# Patient Record
Sex: FEMALE | Race: WHITE | NOT HISPANIC OR LATINO
[De-identification: names, ages, dates, MRNs, and addresses within clinical notes are randomized per-mention and may not be internally consistent; named-entity substitution may affect disease eponyms.]

---

## 2022-11-21 PROBLEM — Z00.00 ENCOUNTER FOR PREVENTIVE HEALTH EXAMINATION: Status: ACTIVE | Noted: 2022-11-21

## 2023-01-23 ENCOUNTER — NON-APPOINTMENT (OUTPATIENT)
Age: 48
End: 2023-01-23

## 2023-01-23 ENCOUNTER — APPOINTMENT (OUTPATIENT)
Dept: NEUROLOGY | Facility: CLINIC | Age: 48
End: 2023-01-23
Payer: COMMERCIAL

## 2023-01-23 VITALS
TEMPERATURE: 98.7 F | WEIGHT: 138 LBS | OXYGEN SATURATION: 98 % | BODY MASS INDEX: 23.27 KG/M2 | HEIGHT: 64.5 IN | HEART RATE: 82 BPM | SYSTOLIC BLOOD PRESSURE: 122 MMHG | DIASTOLIC BLOOD PRESSURE: 83 MMHG

## 2023-01-23 PROCEDURE — 99204 OFFICE O/P NEW MOD 45 MIN: CPT

## 2023-01-30 ENCOUNTER — OUTPATIENT (OUTPATIENT)
Dept: OUTPATIENT SERVICES | Facility: HOSPITAL | Age: 48
LOS: 1 days | End: 2023-01-30
Payer: SELF-PAY

## 2023-01-30 ENCOUNTER — RESULT REVIEW (OUTPATIENT)
Age: 48
End: 2023-01-30

## 2023-01-30 ENCOUNTER — APPOINTMENT (OUTPATIENT)
Dept: MRI IMAGING | Facility: HOSPITAL | Age: 48
End: 2023-01-30
Payer: SELF-PAY

## 2023-01-30 PROCEDURE — 70553 MRI BRAIN STEM W/O & W/DYE: CPT | Mod: 26

## 2023-01-30 PROCEDURE — 70553 MRI BRAIN STEM W/O & W/DYE: CPT

## 2023-01-30 PROCEDURE — A9585: CPT

## 2023-02-06 ENCOUNTER — APPOINTMENT (OUTPATIENT)
Dept: NEUROLOGY | Facility: CLINIC | Age: 48
End: 2023-02-06
Payer: COMMERCIAL

## 2023-02-06 ENCOUNTER — NON-APPOINTMENT (OUTPATIENT)
Age: 48
End: 2023-02-06

## 2023-02-06 PROCEDURE — 95816 EEG AWAKE AND DROWSY: CPT

## 2023-02-07 ENCOUNTER — APPOINTMENT (OUTPATIENT)
Dept: NEUROLOGY | Facility: CLINIC | Age: 48
End: 2023-02-07

## 2023-02-07 PROCEDURE — 95708 EEG WO VID EA 12-26HR UNMNTR: CPT

## 2023-02-07 PROCEDURE — 95719 EEG PHYS/QHP EA INCR W/O VID: CPT

## 2023-02-07 PROCEDURE — 95700 EEG CONT REC W/VID EEG TECH: CPT

## 2023-02-09 ENCOUNTER — APPOINTMENT (OUTPATIENT)
Dept: NEUROLOGY | Facility: CLINIC | Age: 48
End: 2023-02-09
Payer: COMMERCIAL

## 2023-02-09 VITALS
TEMPERATURE: 97.9 F | SYSTOLIC BLOOD PRESSURE: 108 MMHG | WEIGHT: 138 LBS | HEIGHT: 64.5 IN | RESPIRATION RATE: 16 BRPM | BODY MASS INDEX: 23.27 KG/M2 | HEART RATE: 77 BPM | OXYGEN SATURATION: 100 % | DIASTOLIC BLOOD PRESSURE: 70 MMHG

## 2023-02-09 PROCEDURE — 99214 OFFICE O/P EST MOD 30 MIN: CPT

## 2023-02-10 NOTE — ASSESSMENT
[FreeTextEntry1] : 48yo F w/ transient neurological event 06/2022 of unclear etiology. No known stroke risk factors and non-focal symptoms/findings. Also with persistent vertigo/tinnitus\par MRI brain 1/30/2023- 3 mm right cochlear nerve schwannoma\par \par We discussed that MRI brain findings likely related to tinnitus/vertigo. She has appointment with ENT scheduled . Findings however do not explain transient event that initially occurred. Will complete TIA work up\par Carotid Doppler\par Echocardiogram\par Obtained records from [facility] ER visit\par F/u in 2-3 months

## 2023-02-10 NOTE — ASSESSMENT
[FreeTextEntry1] : 46yo F w/ transient neurological event 06/2022 of unclear etiology. No known stroke risk factors and non-focal symptoms/findings. Also with persistent vertigo/tinnitus\par \par plan:\par MRI Brain w/wo con IAC protocol\par ENT referral\par Ambulatory EEG\par Obtained records from [facility] ER visit\par Will f/u w/ results

## 2023-02-10 NOTE — PHYSICAL EXAM
[FreeTextEntry1] : Mental status: Awake, alert and oriented x3. No dysarthria, no aphasia.  \par Cranial nerves: Pupils equally round and reactive to light, visual fields full, no nystagmus, extraocular muscles intact, V1 through V3 intact bilaterally and symmetric, face symmetric, hearing intact to finger rub, palate elevation symmetric, tongue was midline.\par Motor: MRC grading 5/5 UE/LE bilaterally. Normal tone and bulk. no abnormal movement \par Sensation: Intact to light touch,  \par Coordination: No dysmetria on finger-to-nose \par Reflexes: 2+ in bilateral UE/LE, \par Gait: narrow steady, stable toe, heel and tandem walk\par \par

## 2023-02-10 NOTE — DATA REVIEWED
[de-identified] : ACC: [id number]     EXAM:  MR IAC ONLY WAW IC   ORDERED BY: SHIRLEY AREVALO\par \par PROCEDURE DATE:  01/30/2023\par \par \par \par INTERPRETATION:  Sagittal, axial and coronal imaging of the brain was performed with attention to the internal auditory canals bilaterally.  T1 and T2 weighted sequences were acquired both before and following intravenous contrast administration, including volumetric and fat-suppressed sequences.\par \par Contrast dose: 7 cc of intravenous Gadavist.\par \par Clinical information: Tinnitus and vertigo.\par \par There is a 3 mm enhancing mass in the mid portion of the right internal auditory canal based on the cochlear nerve and most compatible with a cochlear nerve schwannoma. The left internal auditory canal is normal, and there is normal signal in the labyrinth bilaterally.\par \par The ventricles, sulci and cisterns are normal in caliber for the patient's age without additional intracranial mass or site of pathologic contrast enhancement. There is normal signal in the brain parenchyma on all pulse sequences. There is a partially empty sella. The craniocervical junction is normal. There is opacification of the left frontal sinus with the remaining paranasal sinuses as well as the mastoid air cells predominantly ventilated bilaterally.\par \par IMPRESSION:\par \par 3 mm right cochlear nerve schwannoma, as above.\par \par --- End of Report ---\par \par \par \par \par \par NEMESIO NICE MD; Attending Radiologist\par This document has been electronically signed. Feb 1 2023  1:19PM

## 2023-02-10 NOTE — HISTORY OF PRESENT ILLNESS
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 She said she turned pale and client recognized something was wrong and advised her to go to ER. She stopped the session and went to find her .  S  She expressed associated palpations, pressure behind OU w/ blurring, difficulty walking (legs felt like jello) and dizziness.  checked her vitals- /100, HR 80bpm. Decided to go to [facility] ER,  [facility]  negative for bleed/ischemia. She reports she felt like she was having trouble finding her words, knew she was confused for few hours. Later that day she experienced a bifrontal 7/10 pressure like HA that lasted a day. She denies focal limb weakness, sensation changes, changes in speech and hearing.  \par \par  About a week later she started  getting ringing in her right ear that would happened multiple time throughout the day as well as vertigo (whole day duration 1x / month when lying down felt like its throbbing in ear). Head exercise and OTC Dramamine helps w/ vertigo symptoms. however, both symptoms persist presently. 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

## 2023-02-10 NOTE — HISTORY OF PRESENT ILLNESS
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 She said she turned pale and client recognized something was wrong and advised her to go to ER. She stopped the session and went to find her .  S  She expressed associated palpations, pressure behind OU w/ blurring, difficulty walking (legs felt like jello) and dizziness.  checked her vitals- /100, HR 80bpm. Decided to go to [facility] ER,  [facility]  negative for bleed/ischemia. She reports she felt like she was having trouble finding her words, knew she was confused for few hours. Later that day she experienced a bifrontal 7/10 pressure like HA that lasted a day. She denies focal limb weakness, sensation changes, changes in speech and hearing.  \par \par  About a week later she started  getting ringing in her right ear that would happened multiple time throughout the day as well as vertigo (whole day duration 1x / month when lying down felt like its throbbing in ear). Head exercise and OTC Dramamine helps w/ vertigo symptoms. however, both symptoms persist presently. 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

## 2023-02-13 ENCOUNTER — APPOINTMENT (OUTPATIENT)
Dept: OTOLARYNGOLOGY | Facility: CLINIC | Age: 48
End: 2023-02-13
Payer: COMMERCIAL

## 2023-02-13 VITALS
HEIGHT: 64.5 IN | DIASTOLIC BLOOD PRESSURE: 84 MMHG | SYSTOLIC BLOOD PRESSURE: 127 MMHG | BODY MASS INDEX: 23.1 KG/M2 | WEIGHT: 137 LBS | HEART RATE: 88 BPM

## 2023-02-13 DIAGNOSIS — Z78.9 OTHER SPECIFIED HEALTH STATUS: ICD-10-CM

## 2023-02-13 DIAGNOSIS — Z83.3 FAMILY HISTORY OF DIABETES MELLITUS: ICD-10-CM

## 2023-02-13 PROCEDURE — 99205 OFFICE O/P NEW HI 60 MIN: CPT | Mod: 25

## 2023-02-13 PROCEDURE — 31231 NASAL ENDOSCOPY DX: CPT

## 2023-02-13 NOTE — HISTORY OF PRESENT ILLNESS
[de-identified] : 2/13/23\par 47-year-old female with history of septoplasty and sinus surgery presents with concern for dizziness as well as tinnitus.  Symptoms began in June/2022,  during a therapy session she had findings including,  confusion, loss to time and space as well as the feeling of electricity traveling from head to toes.  She was ultimately seen at the [facility] emergency room with a stroke ruled out.  The following week she began to have nonpulsatile tinnitus in the right ear as well as symptoms of dizziness - feeling pressure behind her eyes and feeling like "everything was moving with" her. Tinnitus is a high pitched, intermittent, nonpulsatile. She feels that this has been worsening and now notices this on the left. Sleeping well, minimal caffeine use, no new stress or alcohol use Denies hearing changes, otalgia or otorrhea. She reports lightheadedness and nausea for the past several weeks. She feels like her balance has been off. Occurs about 3x a month. No true episodes of "room spinning" for 6 months. No falls or LOC. No ear, nose, throat symptoms otherwise.\par \par She also reports facial pressure worse on the left. Symptoms intermittent. Pt with known hx of: vertigo. PTSD, anxiety, depression\par \par Patient seen by neurology,  and diagnosed with a transient neurologic event.  MRI/IAC completed with concern for vestibular schwannoma, 3 mm, on the right side.  TIA work-up has been negative to date.    EEG has been unremarkable.    Further testing with carotid Doppler and echocardiogram to be completed.

## 2023-02-13 NOTE — ASSESSMENT
[FreeTextEntry1] : 47-year-old female who presents with concern for tinnitus and vertiginous symptoms.  Examination today has been normal.  At this time I am recommending audiogram, tympanogram, VNG.  Patient with recent diagnosis of vestibular schwannoma on MRI.  Recommending consultation with neurosurgery for further monitoring and management.  Patient will follow-up after the above, sooner should symptoms worsen or fail to improve.\par \par Of note, patient has history of sinus surgery and reports intermittent facial pressure. MRI IAC did show opacification of the left front sinus. We will treat with 10 days of Augmentin. Follow up after testing to review results and repeat eval. \par \par –consultation with neurosurgery, Dr. Abraham, for monitoring of vestibular schwannoma, info given \par – Audiogram and tympanogram\par – VNG\par - Augmentin x 10 days \par - continue nasal saline irrigation \par – Follow-up after the above, sooner should symptoms worsen or fail to improve

## 2023-02-13 NOTE — PROCEDURE
[FreeTextEntry6] : -\par Nasal Endoscopy Procedure Note\par \par Pre-operative Diagnosis: facial pressure \par Post-operative Diagnosis: evidence of sinus surgery\par Anesthesia: Topical\par Procedure: Bilateral nasal endoscopy\par  \par Procedure Details: \par After topical anesthesia and decongestant, the patient was placed in the supine position. The telescope was passed along the left nasal floor to the nasopharynx. It was then passed into the region of the middle meatus, middle turbinate, and the sphenoethmoid region. An identical procedure was performed on the right side. \par  \par Findings: \par Mucosa: 	 normal	\par Nasal septum: normal	\par Discharge: 	none	\par Turbinates: 	normal	\par Adenoid: 	 normal	\par Posterior choanae: 	normal	\par Eustachian tubes: 	normal	\par Mucous stranding: 	normal 	\par Lesions: 	 Not present	\par  \par Comments: \par Condition: Stable. Patient tolerated procedure well.\par

## 2023-02-13 NOTE — PHYSICAL EXAM
[Midline] : trachea located in midline position [Normal] : no rashes [] : [place]-Hall[place] test is negative

## 2023-02-13 NOTE — REASON FOR VISIT
[Initial Consultation] : an initial consultation for [FreeTextEntry2] : tinnitus, vertigo, vestibular schwanoma

## 2023-02-16 ENCOUNTER — APPOINTMENT (OUTPATIENT)
Dept: GASTROENTEROLOGY | Facility: CLINIC | Age: 48
End: 2023-02-16
Payer: COMMERCIAL

## 2023-02-16 VITALS
HEIGHT: 64.5 IN | TEMPERATURE: 97.3 F | WEIGHT: 135.8 LBS | HEART RATE: 98 BPM | OXYGEN SATURATION: 100 % | SYSTOLIC BLOOD PRESSURE: 133 MMHG | DIASTOLIC BLOOD PRESSURE: 84 MMHG | BODY MASS INDEX: 22.9 KG/M2

## 2023-02-16 DIAGNOSIS — Z12.11 ENCOUNTER FOR SCREENING FOR MALIGNANT NEOPLASM OF COLON: ICD-10-CM

## 2023-02-16 PROCEDURE — 99203 OFFICE O/P NEW LOW 30 MIN: CPT

## 2023-02-16 NOTE — PHYSICAL EXAM
[Alert] : alert [No Acute Distress] : no acute distress [Sclera] : the sclera and conjunctiva were normal [No Respiratory Distress] : no respiratory distress [No Acc Muscle Use] : no accessory muscle use [Respiration, Rhythm And Depth] : normal respiratory rhythm and effort [Heart Rate And Rhythm] : heart rate was normal and rhythm regular [Abdomen Tenderness] : non-tender [Abdomen Soft] : soft [] : no rash [No Focal Deficits] : no focal deficits [Oriented To Time, Place, And Person] : oriented to person, place, and time

## 2023-02-19 ENCOUNTER — APPOINTMENT (OUTPATIENT)
Dept: ULTRASOUND IMAGING | Facility: HOSPITAL | Age: 48
End: 2023-02-19

## 2023-02-19 ENCOUNTER — OUTPATIENT (OUTPATIENT)
Dept: OUTPATIENT SERVICES | Facility: HOSPITAL | Age: 48
LOS: 1 days | End: 2023-02-19
Payer: COMMERCIAL

## 2023-02-19 PROCEDURE — 93880 EXTRACRANIAL BILAT STUDY: CPT | Mod: 26

## 2023-03-01 PROBLEM — Z82.0 FAMILY HISTORY OF ALZHEIMER'S DISEASE: Status: ACTIVE | Noted: 2023-02-09

## 2023-03-01 PROBLEM — Z82.49 FAMILY HISTORY OF CARDIAC DISORDER: Status: ACTIVE | Noted: 2023-02-09

## 2023-03-02 NOTE — ASSESSMENT
[FreeTextEntry1] : 47F with a h/o vestibular schwannoma who presents for symptoms of abdominal pain and bloating. \par \par #Epigastric pain\par - PPI trial x2 weeks to assess improvement, rx sent\par - will plan on EGD at [facility]\par \par #Screening colonoscopy\par - no prior colonoscopy\par - will arrange for colonoscopy with miralax prep at [facility]\par - risks/benefits/alternatives discussed\par - pre-procedural COVID swab and post-procedural escort discussed\par \par Rosemary Bertrand MD\par PGY-6, Gastroenterology Fellow

## 2023-03-02 NOTE — HISTORY OF PRESENT ILLNESS
[FreeTextEntry1] : 47F with a h/o vestibular schwannoma who presents for symptoms of abdominal pain and bloating. The pain is mostly epigastric, started a month ago, and she cannot pinpoint any alleviating or aggravating factors. With spicy food, she feels like she has more pronounced reactions. She takes medications for her neurological issues, but has been on them for a year. Has some nausea, though attributes to her schwannoma. Intermittently has constipation, but it is improved with fiber intake. Has never had EGD or colonoscopy. \par \par Family h/o negative for colon, stomach, pancreas cancer, Crohn's disease, UC. 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 Former smoker, mostly social but quit in 2000.

## 2023-03-06 ENCOUNTER — APPOINTMENT (OUTPATIENT)
Dept: NEUROSURGERY | Facility: CLINIC | Age: 48
End: 2023-03-06
Payer: COMMERCIAL

## 2023-03-06 ENCOUNTER — NON-APPOINTMENT (OUTPATIENT)
Age: 48
End: 2023-03-06

## 2023-03-06 VITALS
WEIGHT: 135 LBS | TEMPERATURE: 97 F | OXYGEN SATURATION: 98 % | SYSTOLIC BLOOD PRESSURE: 126 MMHG | BODY MASS INDEX: 23.05 KG/M2 | DIASTOLIC BLOOD PRESSURE: 85 MMHG | HEART RATE: 102 BPM | RESPIRATION RATE: 18 BRPM | HEIGHT: 64 IN

## 2023-03-06 DIAGNOSIS — Z82.0 FAMILY HISTORY OF EPILEPSY AND OTHER DISEASES OF THE NERVOUS SYSTEM: ICD-10-CM

## 2023-03-06 DIAGNOSIS — Z82.49 FAMILY HISTORY OF ISCHEMIC HEART DISEASE AND OTHER DISEASES OF THE CIRCULATORY SYSTEM: ICD-10-CM

## 2023-03-06 PROCEDURE — 99204 OFFICE O/P NEW MOD 45 MIN: CPT

## 2023-03-13 ENCOUNTER — RESULT REVIEW (OUTPATIENT)
Age: 48
End: 2023-03-13

## 2023-03-13 ENCOUNTER — APPOINTMENT (OUTPATIENT)
Dept: OTOLARYNGOLOGY | Facility: CLINIC | Age: 48
End: 2023-03-13
Payer: COMMERCIAL

## 2023-03-13 VITALS
HEART RATE: 98 BPM | SYSTOLIC BLOOD PRESSURE: 121 MMHG | HEIGHT: 64 IN | TEMPERATURE: 95 F | WEIGHT: 134 LBS | DIASTOLIC BLOOD PRESSURE: 82 MMHG | OXYGEN SATURATION: 98 % | BODY MASS INDEX: 22.88 KG/M2

## 2023-03-13 DIAGNOSIS — J01.90 ACUTE SINUSITIS, UNSPECIFIED: ICD-10-CM

## 2023-03-13 PROCEDURE — 92540 BASIC VESTIBULAR EVALUATION: CPT

## 2023-03-13 PROCEDURE — 92537 CALORIC VSTBLR TEST W/REC: CPT

## 2023-03-13 PROCEDURE — 92550 TYMPANOMETRY & REFLEX THRESH: CPT | Mod: 52

## 2023-03-13 PROCEDURE — 92557 COMPREHENSIVE HEARING TEST: CPT

## 2023-03-13 PROCEDURE — 99215 OFFICE O/P EST HI 40 MIN: CPT | Mod: 25

## 2023-03-13 PROCEDURE — 31575 DIAGNOSTIC LARYNGOSCOPY: CPT

## 2023-03-13 NOTE — REASON FOR VISIT
[New Patient Visit] : a new patient visit [Referred By: _________] : Patient was referred by JESICA [FreeTextEntry1] : R vestibular schwannoma

## 2023-03-13 NOTE — ASSESSMENT
[FreeTextEntry1] : PLAN\par - repeat MRI brain IAC w/wo in 6 months for R vestibular schwannoma evaluation\par - f/u after image

## 2023-03-13 NOTE — HISTORY OF PRESENT ILLNESS
[> 3 months] : more  than 3 months [FreeTextEntry1] : vertigo, tinnitus [de-identified] : 46 yo F with PMH of depression, anxiety, PTSD who reports chronic dizziness, tinnitus on R ear. 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 He went [facility] ED at that time where CTH showed no bleeding/ischemic stroke. And she started to noticed intermittent ringing in the R ear with vertigo. She tried OTC Dramamine without relief. She was seen by neurology (Dr. Ortez) initially where MRI internal auditory cannel revealed 3mm R cochlear nerve schwannoma and referred to ENT. In trim, she had EEG, carotid artery US, nasal endoscopy test were completed which showed unremarkable result. \par \par She reports progressively worsening ringing in ear initially on R sided couple which initially started 2 weeks after ED visit in 6/2022 but noticed past 2 weeks of L sided as well. She states dizziness, palpitation, weakness, words finding has been improved.\par \par pending audiogram/VNG scheduled in next week.

## 2023-03-13 NOTE — PROCEDURE
[de-identified] : -\par Pre-operative Diagnosis:   Concern for neck mass/thyroglossal duct cyst\par Post-operative Diagnosis:  normal exam\par Anesthesia: Topical - 1 % Lidocaine/Phenylephrine\par Procedure:  Flexible Laryngoscopy\par  \par Procedure Details:  \par The patient was placed in the sitting position.  After decongestant and anesthesia were applied the laryngoscope was passed.  The nasal cavities, nasopharynx, oropharynx, hypopharynx, and larynx were all examined.  Vocal folds were examined during respiration and phonation.  The following findings were noted:\par \par Findings:  \par Nose: Septum is midline, turbinates are normal, nasal airways patent, mucosa normal with postsurgical change\par Nasopharynx: Adenoids normal, no masses, eustachian tube normal\par Oropharynx: Pharyngeal walls symmetric and without lesion. Tonsils/fossae symmetric\par Hypopharynx: Hypopharynx and pyriform sinuses without lesion. No masses or asymmetry.  No pooling of secretions.\par Larynx:  Epiglottis and aryepiglottic folds were sharp and crisp bilaterally.  Bilateral false and true vocal folds normal appearance. Bilateral vocal folds fully mobile and symmetric.  Airway was widely patent.\par \par Condition: Stable.  Patient tolerated procedure well.\par \par Complications: None\par \par

## 2023-03-13 NOTE — DATA REVIEWED
[de-identified] : ACC: [id number] EXAM: MR IAC ONLY WAW IC ORDERED BY: SHIRLEY AREVALO\par \par PROCEDURE DATE: 01/30/2023\par \par \par \par INTERPRETATION: Sagittal, axial and coronal imaging of the brain was performed with attention to the internal auditory canals bilaterally. T1 and T2 weighted sequences were acquired both before and following intravenous contrast administration, including volumetric and fat-suppressed sequences.\par \par Contrast dose: 7 cc of intravenous Gadavist.\par \par Clinical information: Tinnitus and vertigo.\par \par There is a 3 mm enhancing mass in the mid portion of the right internal auditory canal based on the cochlear nerve and most compatible with a cochlear nerve schwannoma. The left internal auditory canal is normal, and there is normal signal in the labyrinth bilaterally.\par \par The ventricles, sulci and cisterns are normal in caliber for the patient's age without additional intracranial mass or site of pathologic contrast enhancement. There is normal signal in the brain parenchyma on all pulse sequences. There is a partially empty sella. The craniocervical junction is normal. There is opacification of the left frontal sinus with the remaining paranasal sinuses as well as the mastoid air cells predominantly ventilated bilaterally.\par \par IMPRESSION:\par \par 3 mm right cochlear nerve schwannoma, as above.\par \par --- End of Report ---\par \par \par \par \par \par NEMESIO NICE MD; Attending Radiologist\par This document has been electronically signed. Feb 1 2023 1:19PM

## 2023-03-13 NOTE — ASSESSMENT
[FreeTextEntry1] : 47-year-old female who presents with concern for tinnitus and vertiginous symptoms.  Examination today has been normal.  At this time I am recommending audiogram, tympanogram, VNG.  Patient with recent diagnosis of vestibular schwannoma on MRI.   neurosurgery recommending follow-up in 6 months for repeat MRI.  Audiogram essentially unremarkable with some evidence of high-frequency conductive loss with type A tympanogram bilaterally.    VNG did have some findings consistent with a central ocular motor finding and I am recommending continued follow-up with neurology.\par \par   In terms of concern for sinusitis patient took Augmentin and notes complete resolution of symptoms.   she will continue with nasal saline irrigation as needed and let me know if symptoms return at which point I would recommend formal CT sinus imaging.\par \par   In terms of concern for neck mass exam essentially normal.  Will recommend ultrasound neck to evaluate for potential thyroglossal duct cyst or other lesion.\par \par –Continue follow-up with neurosurgery\par – Ultrasound neck\par – Nasal saline irrigation\par – Continue consultation with neurology\par – Follow-up after ultrasound is completed to review results.

## 2023-03-13 NOTE — DATA REVIEWED
[de-identified] :   3/13/2023\par  tympanogram A bilaterally\par  audiogram shows normal sloping to mild conductive hearing loss bilaterally\par \par  VNG essentially normal:  the eye-movement recordings in the pursuit test showed low gain and asymmetry.  This is a central ocular motor finding

## 2023-03-13 NOTE — HISTORY OF PRESENT ILLNESS
[de-identified] : 2/13/23\par 47-year-old female who presents with concern for dizziness as well as tinnitus.  Symptoms began in June/2022,  during a therapy session she had findings including,  confusion, loss to time and space as well as the feeling of electricity traveling from head to toes.  She was ultimately seen at the [facility] emergency room with a stroke ruled out.  The following week she began to have nonpulsatile tinnitus in the right ear as well as symptoms of dizziness.\par \par   She describes the dizziness as,                        no ear, nose, throat symptoms otherwise.\par \par Pt with known hx of: vertigo. PTSD, anxiety, depression\par \par   Patient seen by neurology,  and diagnosed with a transient neurologic event.  MRI/IAC completed with concern for vestibular schwannoma, 3 mm, on the right side.  TIA work-up has been negative to date.    EEG has been unremarkable.    Further testing with carotid Doppler and echocardiogram to be completed.\par - [FreeTextEntry1] :  3/13/2023\par  the patient did complete Augmentin and notes complete resolution of headache type symptoms over the left frontal sinus.    In terms of dizziness and tinnitus patient overall stable since last visit.  She feels that some of her symptoms of dizziness have improved.  She feels that the  tinnitus is more prominent bilaterally.    Has been following with neurosurgery with plan for repeat MRI in 6 months.  Today did complete audiogram as well as VNG and presents to review those results.\par \par  patient is also concerned about some more prominence over the thyroid cartilage.  Is not causing any issues chewing, eating, swallowing or voice changes.  No breathing issues.  She is concerned that she might have a thyroid issue.  No ear, nose, throat symptoms otherwise.

## 2023-03-16 ENCOUNTER — APPOINTMENT (OUTPATIENT)
Age: 48
End: 2023-03-16
Payer: COMMERCIAL

## 2023-03-16 ENCOUNTER — RESULT REVIEW (OUTPATIENT)
Age: 48
End: 2023-03-16

## 2023-03-16 PROCEDURE — 43239 EGD BIOPSY SINGLE/MULTIPLE: CPT

## 2023-03-16 PROCEDURE — 45378 DIAGNOSTIC COLONOSCOPY: CPT

## 2023-03-20 ENCOUNTER — OUTPATIENT (OUTPATIENT)
Dept: OUTPATIENT SERVICES | Facility: HOSPITAL | Age: 48
LOS: 1 days | End: 2023-03-20
Payer: COMMERCIAL

## 2023-03-20 ENCOUNTER — APPOINTMENT (OUTPATIENT)
Dept: ULTRASOUND IMAGING | Facility: HOSPITAL | Age: 48
End: 2023-03-20
Payer: COMMERCIAL

## 2023-03-20 PROCEDURE — 76536 US EXAM OF HEAD AND NECK: CPT

## 2023-03-20 PROCEDURE — 76536 US EXAM OF HEAD AND NECK: CPT | Mod: 26

## 2023-04-10 ENCOUNTER — APPOINTMENT (OUTPATIENT)
Dept: OTOLARYNGOLOGY | Facility: CLINIC | Age: 48
End: 2023-04-10
Payer: COMMERCIAL

## 2023-04-10 ENCOUNTER — TRANSCRIPTION ENCOUNTER (OUTPATIENT)
Age: 48
End: 2023-04-10

## 2023-04-10 VITALS
WEIGHT: 134 LBS | BODY MASS INDEX: 22.88 KG/M2 | DIASTOLIC BLOOD PRESSURE: 83 MMHG | HEART RATE: 91 BPM | TEMPERATURE: 97.2 F | SYSTOLIC BLOOD PRESSURE: 125 MMHG | HEIGHT: 64 IN

## 2023-04-10 DIAGNOSIS — H93.13 TINNITUS, BILATERAL: ICD-10-CM

## 2023-04-10 DIAGNOSIS — R22.1 LOCALIZED SWELLING, MASS AND LUMP, NECK: ICD-10-CM

## 2023-04-10 PROCEDURE — 31231 NASAL ENDOSCOPY DX: CPT

## 2023-04-10 PROCEDURE — 99214 OFFICE O/P EST MOD 30 MIN: CPT | Mod: 25

## 2023-04-11 NOTE — PROCEDURE
[FreeTextEntry6] : -\par Procedure Note\par   \par Pre-operative Diagnosis: facial pressure \par Post-operative Diagnosis: normal exam w/ evidence of sinus surgery \par Anesthesia: Topical\par Procedure: Bilateral nasal endoscopy\par   \par Procedure Details: \par After topical anesthesia and decongestant, the patient was placed in the supine position. The telescope was passed along the left nasal floor to the nasopharynx. It was then passed into the region of the middle meatus, middle turbinate, and the sphenoethmoid region.  An identical procedure was performed on the right side. \par   \par Findings: \par Mucosa: 	                normal	\par Nasal septum: 	midline 	\par Discharge: 	none	\par Turbinates: 	normal	\par Adenoid: 	                normal	\par Posterior choanae: 	normal	\par Eustachian tubes: 	normal	\par Mucous stranding: 	normal 	\par Lesions: 	                Not present	\par   \par Comments: \par Condition: Stable. Patient tolerated procedure well.\par Complications: None\par \par

## 2023-04-11 NOTE — ASSESSMENT
[FreeTextEntry1] : 47-year-old female who presents with concern for tinnitus and vertiginous symptoms. Examination today has been normal. At this time I am recommending audiogram, tympanogram, VNG. Patient with recent diagnosis of vestibular schwannoma on MRI. neurosurgery recommending follow-up in 6 months for repeat MRI. Audiogram essentially unremarkable with some evidence of high-frequency conductive loss with type A tympanogram bilaterally. VNG did have some findings consistent with a central ocular motor finding and I am recommending continued follow-up with neurology.\par \par  In terms of concern for sinusitis patient took Augmentin and notes complete resolution of symptoms. she will continue with nasal saline irrigation as needed and let me know if symptoms return at which point I would recommend formal CT sinus imaging.\par \par  In terms of concern for neck mass exam essentially normal. Will recommend ultrasound neck to evaluate for potential thyroglossal duct cyst or other lesion.\par \par Patient presents today to review thyroid US which showed "normal size with few benign tiny spongiform nodules." Recommend annual thyroid US. Patient also with concern for recurrence of facial pressure. Exam normal today. I am recommending continued nasal saline irrigation. Otherwise plan unchanged, including fu with neurosurgery for vestibular schwannoma and neurology consultation for central ocular motor findings on VNG. \par \par –Continue follow-up with neurosurgery\par – Nasal saline irrigation\par – Continue consultation with neurology\par - annual thyroid US \par - fu with us PRN \par \par

## 2023-04-11 NOTE — HISTORY OF PRESENT ILLNESS
[de-identified] : 2/13/23\par 47-year-old female who presents with concern for dizziness as well as tinnitus. Symptoms began in June/2022, during a therapy session she had findings including, confusion, loss to time and space as well as the feeling of electricity traveling from head to toes. She was ultimately seen at the [facility] emergency room with a stroke ruled out. The following week she began to have nonpulsatile tinnitus in the right ear as well as symptoms of dizziness.\par \par  She describes the dizziness as,  no ear, nose, throat symptoms otherwise.\par \par Pt with known hx of: vertigo. PTSD, anxiety, depression\par \par  Patient seen by neurology, and diagnosed with a transient neurologic event. MRI/IAC completed with concern for vestibular schwannoma, 3 mm, on the right side. TIA work-up has been negative to date. EEG has been unremarkable. Further testing with carotid Doppler and echocardiogram to be completed.\par - \par Interval History: 3/13/2023\par  the patient did complete Augmentin and notes complete resolution of headache type symptoms over the left frontal sinus. In terms of dizziness and tinnitus patient overall stable since last visit. She feels that some of her symptoms of dizziness have improved. She feels that the tinnitus is more prominent bilaterally. Has been following with neurosurgery with plan for repeat MRI in 6 months. Today did complete audiogram as well as VNG and presents to review those results.\par \par  patient is also concerned about some more prominence over the thyroid cartilage. Is not causing any issues chewing, eating, swallowing or voice changes. No breathing issues. She is concerned that she might have a thyroid issue. No ear, nose, throat symptoms otherwise. \par - [FreeTextEntry1] : 4/10/23\par Pt presents today to review thyroid US as well as concern for pressure around her eyes for 3 days. Denies drainage, nasal congestion, changes in sense of taste or smell. She has been using nasal saline irrigation. Denies difficulty chewing, eating or swallowing. No breathing issues. No voice changes. She has an appt in June with neurology. Dizziness has improved. No other ENT issues.

## 2023-04-11 NOTE — PHYSICAL EXAM
[Nasal Endoscopy Performed] : nasal endoscopy was performed, see procedure section for findings [Normal] : temporomandibular joint is normal [de-identified] : neck fullness

## 2023-05-01 ENCOUNTER — APPOINTMENT (OUTPATIENT)
Dept: ENDOCRINOLOGY | Facility: CLINIC | Age: 48
End: 2023-05-01
Payer: COMMERCIAL

## 2023-05-01 VITALS
WEIGHT: 135 LBS | HEART RATE: 89 BPM | SYSTOLIC BLOOD PRESSURE: 123 MMHG | BODY MASS INDEX: 23.17 KG/M2 | DIASTOLIC BLOOD PRESSURE: 82 MMHG

## 2023-05-01 DIAGNOSIS — R79.89 OTHER SPECIFIED ABNORMAL FINDINGS OF BLOOD CHEMISTRY: ICD-10-CM

## 2023-05-01 PROCEDURE — 99205 OFFICE O/P NEW HI 60 MIN: CPT

## 2023-05-03 NOTE — DATA REVIEWED
[FreeTextEntry1] : Imaging:\par - 03/20/23 Thryoid US:\par Findings:\par RIGHT LOBE:\par Dimensions: 4.8 x 1.1 x 1.3 cm (sagittal x AP x transverse)\par Echotexture: Homogenous\par Vascularity: Normal\par The right lobe contains the following nodules\par Nodule 1:\par Location: Interpolar\par Dimensions: 0.3 x 0.2 x 0.2 cm (sagittal x AP x transverse)\par Composition: Spongiform,  no suspicious features\par Nodule 2:\par Location: Interpolar\par Dimensions: 0.2 x 0.2 x 0.2 cm (sagittal x AP x transverse)\par Composition: Spongiform, no suspicious features \par LEFT LOBE:\par Dimensions: 3.4 x 1.1 x 1.2 cm (sagittal x AP x transverse)\par Echotexture: Homogenous\par Vascularity: Normal\par The left lobe contains the following nodules\par Nodule 1:\par Location: Interpolar\par Dimensions: 0.2 x 0.1 x 0.1 cm (sagittal x AP x transverse)\par Composition: Spongiform, no suspicious features\par ISTHMUS:\par Dimensions: 0.1 cm AP\par The isthmus lobe contains the following nodules\par CERVICAL LYMPH NODE EVALUATION: -  No abnormal lymph nodes are identified in the neck.\par PARATHYROID EXAMINATION:-  Parathyroid glands not visualized.\par IMPRESSION:\par No evidence of thyroglossal duct cyst. Normal size thyroid gland with few benign tiny spongiform nodules.\par

## 2023-05-03 NOTE — PHYSICAL EXAM
[Alert] : alert [Well Nourished] : well nourished [No Acute Distress] : no acute distress [Well Developed] : well developed [Normal Sclera/Conjunctiva] : normal sclera/conjunctiva [No Proptosis] : no proptosis [EOMI] : extra ocular movement intact [Normal Oropharynx] : the oropharynx was normal [No Thyroid Nodules] : no palpable thyroid nodules [No Accessory Muscle Use] : no accessory muscle use [Clear to Auscultation] : lungs were clear to auscultation bilaterally [Normal S1, S2] : normal S1 and S2 [Normal Rate] : heart rate was normal [Regular Rhythm] : with a regular rhythm [Pedal Pulses Normal] : the pedal pulses are present [Normal Bowel Sounds] : normal bowel sounds [Not Tender] : non-tender [Not Distended] : not distended [Soft] : abdomen soft [No Spinal Tenderness] : no spinal tenderness [Spine Straight] : spine straight [Normal Gait] : normal gait [Normal Strength/Tone] : muscle strength and tone were normal [No Rash] : no rash [Normal Reflexes] : deep tendon reflexes were 2+ and symmetric [No Tremors] : no tremors [Oriented x3] : oriented to person, place, and time [Thyroid Not Enlarged] : the thyroid was not enlarged [Acanthosis Nigricans] : no acanthosis nigricans [de-identified] : R lobe visualized upon swallowing, R Lobe palpated, no nodules, no mass L3 lymph node palpated

## 2023-05-03 NOTE — ADDENDUM
[FreeTextEntry1] : I, Juju Rousseau act soley as a scribe for Dr. Carter Arechiga on this date. 05/01/2023 \par \par Referring OBGYN: Dr. Sarah Quinonez Phone [phone] \par

## 2023-05-03 NOTE — HISTORY OF PRESENT ILLNESS
[FreeTextEntry1] : 47 year old F pt, with Hx of abnormal T3 levels  (~02/2023), referred by Dr. Sarah Quinonez (OBGYN), presents today to establish endocrine care. \par Other PMHx: tinnitus, vestibular schwannoma, anxiety \par PSHx: tonsillectomy (2008), deviated septum (2006)\par FHx: Father: Heart disease.  No FHx of: thyroid disorder\par Allergies: codeine, prazosin, benzonatate, promethazine \par P0\par PCP: Dr. 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 She endorses hair loss\par \par [Medications verified as per pt on 05/01/2023) \par Current Medications: Wellbutrin 450 mg, Lamictal 25 mg, Spironolactone 25 mg, Minoxidil 2.5 mg, BuSpar PRN, Finasteride 1 mg, Propranolol 30 mg, Norethindrone-Ethinyl Estradiol 20 mcg  contraceptives

## 2023-05-03 NOTE — REASON FOR VISIT
[Initial Evaluation] : an initial evaluation [Other___] : [unfilled] [FreeTextEntry2] : Dr. Sarah Quinonez [phone]

## 2023-05-03 NOTE — END OF VISIT
[FreeTextEntry3] : All medical record entries made by the Scribe were at my, Dr. Carter Arechiga, direction and personally dictated by me on 05/01/2023. I have reviewed the chart and agree that the record accurately reflects my personal performance of the history, physical exam, assessment and plan. I have also personally directed, reviewed and agreed with the chart.  [Time Spent: ___ minutes] : I have spent [unfilled] minutes of time on the encounter.

## 2023-05-08 PROCEDURE — 97164 PT RE-EVAL EST PLAN CARE: CPT

## 2023-05-08 PROCEDURE — 93880 EXTRACRANIAL BILAT STUDY: CPT

## 2023-06-03 LAB
DHEA-S SERPL-MCNC: 42.8 UG/DL
T3FREE SERPL-MCNC: 2.8 PG/ML
T4 FREE SERPL-MCNC: 1.2 NG/DL
TSH SERPL-ACNC: 3.66 UIU/ML

## 2023-06-06 ENCOUNTER — APPOINTMENT (OUTPATIENT)
Age: 48
End: 2023-06-06
Payer: COMMERCIAL

## 2023-06-06 VITALS
BODY MASS INDEX: 23.56 KG/M2 | OXYGEN SATURATION: 97 % | TEMPERATURE: 97.3 F | RESPIRATION RATE: 15 BRPM | SYSTOLIC BLOOD PRESSURE: 125 MMHG | HEIGHT: 64 IN | WEIGHT: 138 LBS | DIASTOLIC BLOOD PRESSURE: 80 MMHG | HEART RATE: 93 BPM

## 2023-06-06 PROCEDURE — 99214 OFFICE O/P EST MOD 30 MIN: CPT

## 2023-06-06 RX ORDER — OMEPRAZOLE 40 MG/1
40 CAPSULE, DELAYED RELEASE ORAL
Qty: 30 | Refills: 3 | Status: DISCONTINUED | COMMUNITY
Start: 2023-02-16 | End: 2023-06-06

## 2023-06-06 RX ORDER — SPIRONOLACTONE 50 MG/1
50 TABLET ORAL
Qty: 30 | Refills: 0 | Status: DISCONTINUED | COMMUNITY
Start: 2023-02-06 | End: 2023-06-06

## 2023-06-06 RX ORDER — AMOXICILLIN AND CLAVULANATE POTASSIUM 875; 125 MG/1; MG/1
875-125 TABLET, COATED ORAL
Qty: 20 | Refills: 0 | Status: DISCONTINUED | COMMUNITY
Start: 2023-02-13 | End: 2023-06-06

## 2023-06-07 NOTE — PHYSICAL EXAM
[Alert] : alert [No Acute Distress] : no acute distress [Sclera] : the sclera and conjunctiva were normal [No Respiratory Distress] : no respiratory distress [No Acc Muscle Use] : no accessory muscle use [Respiration, Rhythm And Depth] : normal respiratory rhythm and effort [Heart Rate And Rhythm] : heart rate was normal and rhythm regular [Abdomen Tenderness] : non-tender [Abdomen Soft] : soft [] : no rash [No Focal Deficits] : no focal deficits [Oriented To Time, Place, And Person] : oriented to person, place, and time [Normal] : normal bowel sounds, non-tender, no masses, soft, no no hepato-splenomegaly [Bowel Sounds] : normal bowel sounds [No Masses] : no abdominal mass palpated

## 2023-06-08 NOTE — ASSESSMENT
[FreeTextEntry1] : 47F with a h/o vestibular schwannoma who presents for symptoms of abdominal pain and bloating, here for follow up after egd/cscope.\par \par Constipation, chronic \par - discontinue MiraLAX \par - begin mag oxide 500mg at bed time \par - incorporate kiwi and ground flaxseed to diet \par - discussed exercise, hydration and adequate fiber in diet \par \par HCM\par - next cscope due 10 years (2033) \par \par f/u 2 months

## 2023-06-08 NOTE — HISTORY OF PRESENT ILLNESS
[FreeTextEntry1] : 47F with a h/o vestibular schwannoma who presents for symptoms of abdominal pain and bloating, here for follow up after egd/cscope.\par \par 6/6/23\par - abdominal pain resolved \par - eating healthy but suffering with constipation \par - feeling well except constipation \par - taking MiraLAX daily but does not think helping \par - still goes few days without BM\par - egd/cscope March 2023, normal colon and erythema in gastric fundus. path: normal\par \par Previous hx: \par The pain is mostly epigastric, started a month ago, and she cannot pinpoint any alleviating or aggravating factors. With spicy food, she feels like she has more pronounced reactions. She takes medications for her neurological issues, but has been on them for a year. Has some nausea, though attributes to her schwannoma. Intermittently has constipation, but it is improved with fiber intake. Has never had EGD or cscope\par Family h/o negative for colon, stomach, pancreas cancer, Crohn's disease, UC. 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 Former smoker, mostly social but quit in 2000.

## 2023-06-29 ENCOUNTER — APPOINTMENT (OUTPATIENT)
Dept: NEUROLOGY | Facility: CLINIC | Age: 48
End: 2023-06-29
Payer: COMMERCIAL

## 2023-06-29 VITALS
BODY MASS INDEX: 23.56 KG/M2 | DIASTOLIC BLOOD PRESSURE: 83 MMHG | WEIGHT: 138 LBS | OXYGEN SATURATION: 100 % | HEIGHT: 64 IN | SYSTOLIC BLOOD PRESSURE: 133 MMHG | RESPIRATION RATE: 16 BRPM | HEART RATE: 91 BPM | TEMPERATURE: 97.6 F

## 2023-06-29 DIAGNOSIS — H53.9 UNSPECIFIED VISUAL DISTURBANCE: ICD-10-CM

## 2023-06-29 DIAGNOSIS — R29.818 OTHER SYMPTOMS AND SIGNS INVOLVING THE NERVOUS SYSTEM: ICD-10-CM

## 2023-06-29 DIAGNOSIS — R42 DIZZINESS AND GIDDINESS: ICD-10-CM

## 2023-06-29 PROCEDURE — 99213 OFFICE O/P EST LOW 20 MIN: CPT

## 2023-07-24 ENCOUNTER — OUTPATIENT (OUTPATIENT)
Dept: OUTPATIENT SERVICES | Facility: HOSPITAL | Age: 48
LOS: 1 days | End: 2023-07-24
Payer: COMMERCIAL

## 2023-07-24 ENCOUNTER — RESULT REVIEW (OUTPATIENT)
Age: 48
End: 2023-07-24

## 2023-07-24 ENCOUNTER — APPOINTMENT (OUTPATIENT)
Dept: MRI IMAGING | Facility: HOSPITAL | Age: 48
End: 2023-07-24

## 2023-07-24 PROCEDURE — A9585: CPT

## 2023-07-24 PROCEDURE — 70553 MRI BRAIN STEM W/O & W/DYE: CPT | Mod: 26

## 2023-07-24 PROCEDURE — 70553 MRI BRAIN STEM W/O & W/DYE: CPT

## 2023-07-25 ENCOUNTER — EMERGENCY (EMERGENCY)
Facility: HOSPITAL | Age: 48
LOS: 1 days | Discharge: HOME | End: 2023-07-25
Attending: EMERGENCY MEDICINE | Admitting: EMERGENCY MEDICINE
Payer: COMMERCIAL

## 2023-07-25 VITALS
OXYGEN SATURATION: 100 % | SYSTOLIC BLOOD PRESSURE: 142 MMHG | DIASTOLIC BLOOD PRESSURE: 81 MMHG | TEMPERATURE: 98 F | RESPIRATION RATE: 18 BRPM | HEART RATE: 78 BPM

## 2023-07-25 VITALS
OXYGEN SATURATION: 100 % | HEART RATE: 102 BPM | TEMPERATURE: 98 F | DIASTOLIC BLOOD PRESSURE: 100 MMHG | RESPIRATION RATE: 18 BRPM | SYSTOLIC BLOOD PRESSURE: 157 MMHG

## 2023-07-25 DIAGNOSIS — Z86.018 PERSONAL HISTORY OF OTHER BENIGN NEOPLASM: ICD-10-CM

## 2023-07-25 DIAGNOSIS — R47.9 UNSPECIFIED SPEECH DISTURBANCES: ICD-10-CM

## 2023-07-25 DIAGNOSIS — Z86.59 PERSONAL HISTORY OF OTHER MENTAL AND BEHAVIORAL DISORDERS: ICD-10-CM

## 2023-07-25 DIAGNOSIS — R20.2 PARESTHESIA OF SKIN: ICD-10-CM

## 2023-07-25 LAB
ALBUMIN SERPL ELPH-MCNC: 4.5 G/DL — SIGNIFICANT CHANGE UP (ref 3.3–5)
ALP SERPL-CCNC: 55 U/L — SIGNIFICANT CHANGE UP (ref 40–120)
ALT FLD-CCNC: 11 U/L — SIGNIFICANT CHANGE UP (ref 10–45)
AMPHET UR-MCNC: NEGATIVE — SIGNIFICANT CHANGE UP
ANION GAP SERPL CALC-SCNC: 13 MMOL/L — SIGNIFICANT CHANGE UP (ref 5–17)
APPEARANCE UR: CLEAR — SIGNIFICANT CHANGE UP
APTT BLD: 27.8 SEC — SIGNIFICANT CHANGE UP (ref 24.5–35.6)
AST SERPL-CCNC: 14 U/L — SIGNIFICANT CHANGE UP (ref 10–40)
BACTERIA # UR AUTO: PRESENT /HPF
BARBITURATES UR SCN-MCNC: NEGATIVE — SIGNIFICANT CHANGE UP
BASOPHILS # BLD AUTO: 0.06 K/UL — SIGNIFICANT CHANGE UP (ref 0–0.2)
BASOPHILS NFR BLD AUTO: 1.1 % — SIGNIFICANT CHANGE UP (ref 0–2)
BENZODIAZ UR-MCNC: NEGATIVE — SIGNIFICANT CHANGE UP
BILIRUB SERPL-MCNC: 0.4 MG/DL — SIGNIFICANT CHANGE UP (ref 0.2–1.2)
BILIRUB UR-MCNC: NEGATIVE — SIGNIFICANT CHANGE UP
BUN SERPL-MCNC: 14 MG/DL — SIGNIFICANT CHANGE UP (ref 7–23)
CALCIUM SERPL-MCNC: 9.1 MG/DL — SIGNIFICANT CHANGE UP (ref 8.4–10.5)
CHLORIDE SERPL-SCNC: 104 MMOL/L — SIGNIFICANT CHANGE UP (ref 96–108)
CHOLEST SERPL-MCNC: 167 MG/DL — SIGNIFICANT CHANGE UP
CO2 SERPL-SCNC: 19 MMOL/L — LOW (ref 22–31)
COCAINE METAB.OTHER UR-MCNC: NEGATIVE — SIGNIFICANT CHANGE UP
COLOR SPEC: YELLOW — SIGNIFICANT CHANGE UP
CREAT SERPL-MCNC: 1.03 MG/DL — SIGNIFICANT CHANGE UP (ref 0.5–1.3)
DIFF PNL FLD: ABNORMAL
EGFR: 67 ML/MIN/1.73M2 — SIGNIFICANT CHANGE UP
EOSINOPHIL # BLD AUTO: 0.04 K/UL — SIGNIFICANT CHANGE UP (ref 0–0.5)
EOSINOPHIL NFR BLD AUTO: 0.8 % — SIGNIFICANT CHANGE UP (ref 0–6)
EPI CELLS # UR: ABNORMAL /HPF (ref 0–5)
GLUCOSE SERPL-MCNC: 105 MG/DL — HIGH (ref 70–99)
GLUCOSE UR QL: NEGATIVE — SIGNIFICANT CHANGE UP
HCG UR QL: NEGATIVE — SIGNIFICANT CHANGE UP
HCT VFR BLD CALC: 40.9 % — SIGNIFICANT CHANGE UP (ref 34.5–45)
HDLC SERPL-MCNC: 60 MG/DL — SIGNIFICANT CHANGE UP
HGB BLD-MCNC: 14.3 G/DL — SIGNIFICANT CHANGE UP (ref 11.5–15.5)
IMM GRANULOCYTES NFR BLD AUTO: 0.2 % — SIGNIFICANT CHANGE UP (ref 0–0.9)
INR BLD: 1.04 — SIGNIFICANT CHANGE UP (ref 0.85–1.18)
KETONES UR-MCNC: NEGATIVE — SIGNIFICANT CHANGE UP
LEUKOCYTE ESTERASE UR-ACNC: NEGATIVE — SIGNIFICANT CHANGE UP
LIPID PNL WITH DIRECT LDL SERPL: 96 MG/DL — SIGNIFICANT CHANGE UP
LYMPHOCYTES # BLD AUTO: 1.68 K/UL — SIGNIFICANT CHANGE UP (ref 1–3.3)
LYMPHOCYTES # BLD AUTO: 31.7 % — SIGNIFICANT CHANGE UP (ref 13–44)
MCHC RBC-ENTMCNC: 34.2 PG — HIGH (ref 27–34)
MCHC RBC-ENTMCNC: 35 GM/DL — SIGNIFICANT CHANGE UP (ref 32–36)
MCV RBC AUTO: 97.8 FL — SIGNIFICANT CHANGE UP (ref 80–100)
METHADONE UR-MCNC: NEGATIVE — SIGNIFICANT CHANGE UP
MONOCYTES # BLD AUTO: 0.46 K/UL — SIGNIFICANT CHANGE UP (ref 0–0.9)
MONOCYTES NFR BLD AUTO: 8.7 % — SIGNIFICANT CHANGE UP (ref 2–14)
NEUTROPHILS # BLD AUTO: 3.05 K/UL — SIGNIFICANT CHANGE UP (ref 1.8–7.4)
NEUTROPHILS NFR BLD AUTO: 57.5 % — SIGNIFICANT CHANGE UP (ref 43–77)
NITRITE UR-MCNC: NEGATIVE — SIGNIFICANT CHANGE UP
NON HDL CHOLESTEROL: 107 MG/DL — SIGNIFICANT CHANGE UP
NRBC # BLD: 0 /100 WBCS — SIGNIFICANT CHANGE UP (ref 0–0)
OPIATES UR-MCNC: NEGATIVE — SIGNIFICANT CHANGE UP
PCP SPEC-MCNC: SIGNIFICANT CHANGE UP
PCP UR-MCNC: NEGATIVE — SIGNIFICANT CHANGE UP
PH UR: 6.5 — SIGNIFICANT CHANGE UP (ref 5–8)
PLATELET # BLD AUTO: 298 K/UL — SIGNIFICANT CHANGE UP (ref 150–400)
POTASSIUM SERPL-MCNC: 4 MMOL/L — SIGNIFICANT CHANGE UP (ref 3.5–5.3)
POTASSIUM SERPL-SCNC: 4 MMOL/L — SIGNIFICANT CHANGE UP (ref 3.5–5.3)
PROT SERPL-MCNC: 7.2 G/DL — SIGNIFICANT CHANGE UP (ref 6–8.3)
PROT UR-MCNC: NEGATIVE MG/DL — SIGNIFICANT CHANGE UP
PROTHROM AB SERPL-ACNC: 11.8 SEC — SIGNIFICANT CHANGE UP (ref 9.5–13)
RBC # BLD: 4.18 M/UL — SIGNIFICANT CHANGE UP (ref 3.8–5.2)
RBC # FLD: 12.2 % — SIGNIFICANT CHANGE UP (ref 10.3–14.5)
RBC CASTS # UR COMP ASSIST: < 5 /HPF — SIGNIFICANT CHANGE UP
SODIUM SERPL-SCNC: 136 MMOL/L — SIGNIFICANT CHANGE UP (ref 135–145)
SP GR SPEC: <=1.005 — SIGNIFICANT CHANGE UP (ref 1–1.03)
THC UR QL: NEGATIVE — SIGNIFICANT CHANGE UP
TRIGL SERPL-MCNC: 56 MG/DL — SIGNIFICANT CHANGE UP
TROPONIN T, HIGH SENSITIVITY RESULT: 7 NG/L — SIGNIFICANT CHANGE UP (ref 0–51)
UROBILINOGEN FLD QL: 0.2 E.U./DL — SIGNIFICANT CHANGE UP
WBC # BLD: 5.3 K/UL — SIGNIFICANT CHANGE UP (ref 3.8–10.5)
WBC # FLD AUTO: 5.3 K/UL — SIGNIFICANT CHANGE UP (ref 3.8–10.5)
WBC UR QL: < 5 /HPF — SIGNIFICANT CHANGE UP

## 2023-07-25 PROCEDURE — 70496 CT ANGIOGRAPHY HEAD: CPT | Mod: 26,MA

## 2023-07-25 PROCEDURE — 80307 DRUG TEST PRSMV CHEM ANLYZR: CPT

## 2023-07-25 PROCEDURE — 70450 CT HEAD/BRAIN W/O DYE: CPT | Mod: 26,MA,59

## 2023-07-25 PROCEDURE — 82962 GLUCOSE BLOOD TEST: CPT

## 2023-07-25 PROCEDURE — 36415 COLL VENOUS BLD VENIPUNCTURE: CPT

## 2023-07-25 PROCEDURE — 81025 URINE PREGNANCY TEST: CPT

## 2023-07-25 PROCEDURE — 81001 URINALYSIS AUTO W/SCOPE: CPT

## 2023-07-25 PROCEDURE — 85610 PROTHROMBIN TIME: CPT

## 2023-07-25 PROCEDURE — 70450 CT HEAD/BRAIN W/O DYE: CPT | Mod: MA

## 2023-07-25 PROCEDURE — 70498 CT ANGIOGRAPHY NECK: CPT | Mod: 26,MA

## 2023-07-25 PROCEDURE — 70551 MRI BRAIN STEM W/O DYE: CPT | Mod: MG

## 2023-07-25 PROCEDURE — 99285 EMERGENCY DEPT VISIT HI MDM: CPT | Mod: 25

## 2023-07-25 PROCEDURE — 0042T: CPT | Mod: MA

## 2023-07-25 PROCEDURE — 85730 THROMBOPLASTIN TIME PARTIAL: CPT

## 2023-07-25 PROCEDURE — G1004: CPT

## 2023-07-25 PROCEDURE — 99285 EMERGENCY DEPT VISIT HI MDM: CPT

## 2023-07-25 PROCEDURE — 70496 CT ANGIOGRAPHY HEAD: CPT | Mod: MA

## 2023-07-25 PROCEDURE — 80053 COMPREHEN METABOLIC PANEL: CPT

## 2023-07-25 PROCEDURE — 80061 LIPID PANEL: CPT

## 2023-07-25 PROCEDURE — 70498 CT ANGIOGRAPHY NECK: CPT | Mod: MA

## 2023-07-25 PROCEDURE — 84484 ASSAY OF TROPONIN QUANT: CPT

## 2023-07-25 PROCEDURE — 85025 COMPLETE CBC W/AUTO DIFF WBC: CPT

## 2023-07-25 PROCEDURE — 70551 MRI BRAIN STEM W/O DYE: CPT | Mod: 26,MG

## 2023-07-25 PROCEDURE — 93005 ELECTROCARDIOGRAM TRACING: CPT

## 2023-07-25 NOTE — CONSULT NOTE ADULT - ASSESSMENT
47y Female with PMHx of anxiety, depression, complex PTSD, and right vestibular schwannoma presents to the ED with difficulty with speech and cognition and tingling on left arm. Stroke Code called. Patient emotionally labile with hesitant speech, making paraphasic errors. NIHSS 1. Imaging unremarkable. 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 Stat MRI done in the ED as patient was still in the window for TNK, negative for any changes on DWI sequences. TNK not indicated given normal MRI. Vascular neurologic issue ruled out as etiology of presentation, other differentials include underlying psychiatric disorder vs. seizures although patient has had a negative workup in the past.     No further stroke workup indicated, rest of care per ED. Case discussed with Stroke Fellow Dr. Noel Flores who discussed with Dr. Ana Garibay.

## 2023-07-25 NOTE — ED ADULT NURSE NOTE - OBJECTIVE STATEMENT
Pt presents to ED c/o difficulty with speech, left sided numbness since unknown time this morning. States "yesterday I got a dental procedure and after they numbed me I was seeing things on the wall that werent there. I felt like I was on drugs. Last night I was okay and then this morning I felt like I had when I did acid in the 10th grade. I am feeling very emotional and having trouble getting words out". Stroke code called by triage RN. Brought to CT scan immediately. Pt a&ox4. No facial droop, no slurred speech, no unilateral weakness. Pt tearful, reports feeling anxious.

## 2023-07-25 NOTE — ED PROVIDER NOTE - CARE PROVIDER_API CALL
Jessica Ortez  Neurology  [address]  [place] [address]  Phone: [phone]  Fax: [phone]  Follow Up Time: 1-3 Days

## 2023-07-25 NOTE — ED PROVIDER NOTE - CLINICAL SUMMARY MEDICAL DECISION MAKING FREE TEXT BOX
abnormal lab result Pt with emotional lability difficulty expressing words at times, paresthesias but not reproducible.  Hx similar in past.  Stroke code called.  Work up noted.  Stroke does not believe stroke and unlikely any other neuro.  ? anxiety vs focal sz.  Discussed all options and patient does not want admission and would like dc and outpt fu neurologist.  No si/hi.  Safe discharge.

## 2023-07-25 NOTE — ED PROVIDER NOTE - PATIENT PORTAL LINK FT
You can access the FollowMyHealth Patient Portal offered by [facility] by registering at the following website: [email or web address]. By joining [facility]’s FollowMyHealth portal, you will also be able to view your health information using other applications (apps) compatible with our system.

## 2023-07-25 NOTE — ED ADULT NURSE NOTE - NSFALLUNIVINTERV_ED_ALL_ED
Bed/Stretcher in lowest position, wheels locked, appropriate side rails in place/Call bell, personal items and telephone in reach/Instruct patient to call for assistance before getting out of bed/chair/stretcher/Non-slip footwear applied when patient is off stretcher/Physically safe environment - no spills, clutter or unnecessary equipment/Purposeful proactive rounding/Room/bathroom lighting operational, light cord in reach

## 2023-07-25 NOTE — ED PROVIDER NOTE - OBJECTIVE STATEMENT
47y F hx anxiety, depression, complex PTSD, and right vestibular schwannoma p.w difficulty with speech and cognition and tingling on left arm since 10am.  Hx of similar in past.  Extensive gorman in past.  Recent MRI, reviewed in system. Stroke Code called upon arrival.

## 2023-07-25 NOTE — ED PROVIDER NOTE - NEUROLOGICAL, MLM
Patient is alert, oriented x person, place and time.  Cranial nerves 2-12 are intact.  Normal gait and speech.  Cerebellar testing normal:  negative Romberg, normal coordination and normal finger to nose, heal to shin and rapid alternating movements.  Normal proprioception and sensory exam.  No pronator drift.  5/5 bl upper extremity and lower extremity strength.   Speech intermittently abn as noted above.  NIHSS 1.

## 2023-07-25 NOTE — ED PROVIDER NOTE - NSFOLLOWUPINSTRUCTIONS_ED_ALL_ED_FT
IT IS UNCLEAR WHAT IS CAUSING YOUR TINGLING, WORD FINDING DIFFICULTIES AND HALLUCINATIONS.  THIS WILL NEED CONTINUED EVALUATION AS AN OUTPATIENT WITH YOUR NEUROLOGIST.  FOLLOW UP WITH NEUROLOGY THIS WEEK.  IF YOU HAVE RETURN OF SYMPTOMS OR WORSENING OR NEW SYMPTOMS CONCERNING RETURN TO ER.    Paresthesia  Paresthesia is an abnormal burning or prickling sensation. It is usually felt in the hands, arms, legs, or feet. However, it may occur in any part of the body. Usually, paresthesia is not painful. It may feel like:  Tingling or numbness.  Buzzing.  Itching.  Paresthesia may occur without any clear cause, or it may be caused by:  Breathing too quickly (hyperventilation).  Pressure on a nerve.  An underlying medical condition.  Side effects of a medicine.  Nutritional deficiencies.  Exposure to toxic chemicals.  Most people experience temporary (transient) paresthesia at some time in their lives. For some people, it may be long-lasting (chronic) because of an underlying medical condition. If you have paresthesia that lasts a long time, you need to be evaluated by your health care provider.    Follow these instructions at home:  Nutrition    A plate with examples of foods in a healthy diet.  Eat a healthy diet. This includes:  Eating foods that are high in fiber, such as beans, whole grains, and fresh fruits and vegetables.  Limiting foods that are high in fat and processed sugars, such as fried or sweet foods.  Alcohol use    A sign showing that a person should not drink alcohol.  Avoid or limit alcohol. Too much alcohol can cause a vitamin B deficiency, and vitamin B is needed for healthy nerves.  Do not drink alcohol if:  Your health care provider tells you not to drink.  You are pregnant, may be pregnant, or are planning to become pregnant.  If you drink alcohol:  Limit how much you have to:  0–1 drink a day for women.  0–2 drinks a day for men.  Know how much alcohol is in your drink. In the [place] one drink equals one 12 oz bottle of beer (355 mL), one 5 oz glass of wine (148 mL), or one 1½ oz glass of hard liquor (44 mL).  General instructions    Take over-the-counter and prescription medicines only as told by your health care provider.  Do not use any products that contain nicotine or tobacco. These products include cigarettes, chewing tobacco, and vaping devices, such as e-cigarettes. If you need help quitting, ask your health care provider.  If you have diabetes, work closely with your health care provider to keep your blood sugar under control.  If you have numbness in your feet:  Check every day for signs of injury or infection. Watch for redness, warmth, and swelling.  Wear padded socks and comfortable shoes. These help protect your feet.  Keep all follow-up visits. This is important.  Contact a health care provider if you:  Have paresthesia that gets worse or does not go away.  Have numbness after an injury.  Have a burning or prickling feeling that gets worse when you walk.  Have pain, cramps, or dizziness, or you faint.  Develop a rash.  Get help right away if you:  Feel muscle weakness.  Develop new weakness in an arm or leg.  Have trouble walking or moving.  Have problems with speech, understanding, or vision.  Feel confused.  Cannot control your bladder or bowel movements.  These symptoms may be an emergency. Get help right away. Call 911.  Do not wait to see if the symptoms will go away.  Do not drive yourself to the hospital.  Summary  Paresthesia is an abnormal burning or prickling sensation that is usually felt in the hands, arms, legs, or feet. It may also occur in other parts of the body.  Paresthesia may occur without any clear cause, or it may be caused by breathing too quickly (hyperventilation), pressure on a nerve, an underlying medical condition, side effects of a medicine, nutritional deficiencies, or exposure to toxic chemicals.  If you have paresthesia that lasts a long time, you need to be evaluated by your health care provider.  This information is not intended to replace advice given to you by your health care provider. Make sure you discuss any questions you have with your health care provider.

## 2023-07-25 NOTE — ED ADULT TRIAGE NOTE - CHIEF COMPLAINT QUOTE
Pt to ed c/o altered mental status. "I am having difficulty thinking". Unknown last know well. Noted to have slurred speech. Endorses left sided arm numbness. Stroke code activated. Unknown blood thinner use.

## 2023-07-28 PROBLEM — Z87.891 FORMER SMOKER, STOPPED SMOKING IN DISTANT PAST: Status: ACTIVE | Noted: 2023-03-01

## 2023-07-28 PROBLEM — F32.A DEPRESSION: Status: RESOLVED | Noted: 2023-03-01 | Resolved: 2023-07-28

## 2023-07-28 PROBLEM — D33.3 VESTIBULAR SCHWANNOMA: Status: ACTIVE | Noted: 2023-02-13

## 2023-07-28 PROBLEM — F41.9 ANXIETY: Status: RESOLVED | Noted: 2023-03-01 | Resolved: 2023-07-28

## 2023-07-31 ENCOUNTER — LABORATORY RESULT (OUTPATIENT)
Age: 48
End: 2023-07-31

## 2023-07-31 ENCOUNTER — APPOINTMENT (OUTPATIENT)
Dept: NEUROSURGERY | Facility: CLINIC | Age: 48
End: 2023-07-31
Payer: COMMERCIAL

## 2023-07-31 ENCOUNTER — APPOINTMENT (OUTPATIENT)
Dept: NEUROLOGY | Facility: CLINIC | Age: 48
End: 2023-07-31
Payer: COMMERCIAL

## 2023-07-31 VITALS
TEMPERATURE: 98.2 F | SYSTOLIC BLOOD PRESSURE: 135 MMHG | HEIGHT: 64 IN | HEART RATE: 94 BPM | OXYGEN SATURATION: 99 % | BODY MASS INDEX: 23.22 KG/M2 | DIASTOLIC BLOOD PRESSURE: 83 MMHG | WEIGHT: 136 LBS

## 2023-07-31 DIAGNOSIS — F41.9 ANXIETY DISORDER, UNSPECIFIED: ICD-10-CM

## 2023-07-31 DIAGNOSIS — Z87.891 PERSONAL HISTORY OF NICOTINE DEPENDENCE: ICD-10-CM

## 2023-07-31 DIAGNOSIS — F32.A DEPRESSION, UNSPECIFIED: ICD-10-CM

## 2023-07-31 DIAGNOSIS — D33.3 BENIGN NEOPLASM OF CRANIAL NERVES: ICD-10-CM

## 2023-07-31 PROCEDURE — 99214 OFFICE O/P EST MOD 30 MIN: CPT

## 2023-07-31 NOTE — PHYSICAL EXAM
[General Appearance - Alert] : alert [General Appearance - In No Acute Distress] : in no acute distress [General Appearance - Well Nourished] : well nourished [General Appearance - Well-Appearing] : healthy appearing [Oriented To Time, Place, And Person] : oriented to person, place, and time [Impaired Insight] : insight and judgment were intact [Affect] : the affect was normal [Memory Recent] : recent memory was not impaired [Person] : oriented to person [Place] : oriented to place [Time] : oriented to time [Abnormal Walk] : normal gait [Balance] : balance was intact

## 2023-07-31 NOTE — REASON FOR VISIT
[Follow-Up: _____] : a [unfilled] follow-up visit [FreeTextEntry1] : R vestibular schwannoma returns for 6 month follow up with new MRI brain IAC w/wo. She reports recent ED visit ([facility] on 7/25) for sudden onset of L facial numbness/words finding/L side paresthesia. She was evaluated for TIA and CTA head/neck/MRI brain wo showed unremarkable images, neurology unlikely TIA, recommended psychology consult.  Today she denies recurrent symptoms but continues to c/o R sided tinnitus.

## 2023-07-31 NOTE — HISTORY OF PRESENT ILLNESS
[> 3 months] : more  than 3 months [FreeTextEntry1] : vertigo, tinnitus [de-identified] : 48 yo F with PMH of depression, anxiety, PTSD who reports chronic dizziness, tinnitus on R ear. 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 He went [facility] ED at that time where CTH showed no bleeding/ischemic stroke. And she started to noticed intermittent ringing in the R ear with vertigo. She tried OTC Dramamine without relief. She was seen by neurology (Dr. Ortez) initially where MRI internal auditory cannel revealed 3mm R cochlear nerve schwannoma and referred to ENT. In trim, she had EEG, carotid artery US, nasal endoscopy test were completed which showed unremarkable result.   She reports progressively worsening ringing in ear initially on R sided couple which initially started 2 weeks after ED visit in 6/2022 but noticed past 2 weeks of L sided as well. She states dizziness, palpitation, weakness, words finding has been improved.  audiogram/VNG showed unremarkable.

## 2023-07-31 NOTE — DATA REVIEWED
[de-identified] : ACC: [id number] EXAM: CT ANGIO NECK STROKE PROTCL IC ORDERED BY: GREYSON EVANS  ACC: [id number] EXAM: CT ANGIO BRAIN STROKE PROTC IC ORDERED BY: GREYSON EVANS  PROCEDURE DATE: 07/25/2023    INTERPRETATION: PROCEDURE: CTA brain with intravenous contrast.  INDICATION: Difficulty with speech and arm numbness  TECHNIQUE: Multiple axial thin section were obtained through the [place] of [place] following the intravenous bolus injection of 80 cc of Isovue-370. MIP series are provided.  COMPARISON: None  FINDINGS: The right internal carotid artery is asymmetrically smaller. The internal carotid arteries are otherwise patent at the skull base and intracranial compartment without occlusion or high grade stenosis. The right A1 is absent. The anterior communicating artery is patent. The A2 segments appear intact bilaterally. The left vertebral artery is dominant. The intracranial vertebral arteries, the basilar artery and both posterior cerebral arteries are patent. There is no site of aneurysm within the resolution limitations of CTA.  IMPRESSION: No large vessel occlusion.  -------------------------------------------------------------- PROCEDURE: CTA neck with intravenous contrast.  INDICATION: Difficulty with speech and arm numbness  TECHNIQUE: Multiple axial thin section were obtained through the neck following the intravenous bolus injection of Isovue-370 as above. MIP series are provided.  COMPARISON: None  FINDINGS: The aortic arch is normal size and shows patency, with normal 3 vessel configuration.  The right common carotid artery is asymmetrically smaller. Both common carotid arteries are patent up to the bifurcations.  The right internal carotid artery is asymmetrically smaller and is patent up to the skull base, without hemodynamically significant stenosis or occlusion.  The left internal carotid artery is patent up to the skull base, without hemodynamically significant stenosis or occlusion.  The left vertebral artery is dominant. The cervical vertebral arteries are patent throughout, without hemodynamically significant stenosis or occlusion.  Limited evaluation of the neck demonstrates calcification at the base of the epiglottis. There is a a dilated and air-filled esophagus which may be secondary to underlying hiatal hernia or lower esophageal dysfunction.  IMPRESSION: Normal CTA examination of the neck.  --- End of Report ---     ANANDA HERNANDEZ MD; Resident Radiologist This document has been electronically signed. MARCOS TRUJILLO MD; Attending Radiologist This document has been electronically signed. Jul 25 2023 11:55AM [de-identified] : head w/wo on 7/24/23 in PACS ACC: [id number] EXAM: MR BRAIN WAW IC ORDERED BY: CATHY ELPE  PROCEDURE DATE: 07/24/2023    INTERPRETATION: CLINICAL INDICATIONS: D33.3 vestibular schwannoma  COMPARISON: MRI brain dated 1/30/2023  TECHNIQUE: MRI brain: Multiplanar, multisequence MR imaging of the brain are obtained with and without the administration of 7 cc intravenous Gadavist contrast. 0.5 cc of contrast was discarded  FINDINGS: Stable 2.7 mm right IAC vestibular schwannoma. The contralateral IAC is unremarkable. Partially empty sella is redemonstrated. There is no abnormal restricted diffusion to suggest acute infarction. No abnormal signal is demonstrated throughout the brain parenchyma. Normal T2 flow-voids are seen within the intracranial vasculature. The lateral ventricles and cortical sulci are age-appropriate in size and configuration. There is no mass, mass effect, or extra-axial fluid collection. There is no susceptibility artifact to suggest hemorrhage. Midline structures are normal. Moderate mucosal thickening left frontal sinus, unchanged. The visualized paranasal sinuses, mastoid air cells and orbits are unremarkable.   IMPRESSION: Stable right IAC vestibular schwannoma.  --- End of Report ---      HESHAM COLES MD; Attending Radiologist This document has been electronically signed. Jul 24 2023 7:52PM ACC: [id number] EXAM: MR BRAIN ORDERED BY: YAIMA SANTACRUZ  PROCEDURE DATE: 07/25/2023    INTERPRETATION: INDICATIONS: Altered mental status  TECHNIQUE: Multiplanar MR imaging of the brain was performed using axial T2 FLAIR and diffusion-weighted imaging. No contrast was given.  COMPARISON: Brain MRI 7/24/2023; CT had 7/25/2023  FINDINGS: INTRA-AXIAL: The diffusion-weighted images demonstrate no acute infarct. No parenchymal mass, focal mass effect, or midline shift is present. EXTRA-AXIAL: No extra-axial fluid collection is identified. VENTRICLES AND SULCI: The ventricles, cisternal spaces, and cortical sulci are appropriate for the patient's stated age. VISUALIZED SINUSES: Unchanged inflammatory mucosal thickening left frontal sinus. Remaining paranasal sinuses clear. VISUALIZED MASTOIDS: Clear.  IMPRESSION: No acute ischemia.  --- End of Report ---     AANNDA HERNANDEZ MD; Resident Radiologist This document has been electronically signed. BIDYUT PRAMANIK MD; Attending Radiologist This document has been electronically signed. Jul 25 2023 3:20PM

## 2023-08-04 LAB
ANA SER IF-ACNC: NEGATIVE
B BURGDOR AB SER-IMP: NEGATIVE
B BURGDOR IGG+IGM SER QL: 0.29 INDEX
B2 GLYCOPROT1 AB SER QL: NEGATIVE
B2 GLYCOPROT1 IGA SERPL IA-ACNC: <5 SAU
B2 GLYCOPROT1 IGG SER-ACNC: <5 SGU
B2 GLYCOPROT1 IGM SER-ACNC: <5 SMU
CARDIOLIPIN AB SER IA-ACNC: NEGATIVE
CARDIOLIPIN IGM SER-MCNC: <5 GPL
CRP SERPL-MCNC: <3 MG/L
ERYTHROCYTE [SEDIMENTATION RATE] IN BLOOD BY WESTERGREN METHOD: 5 MM/HR
GAD65 AB SER-MCNC: 0 NMOL/L

## 2023-08-11 ENCOUNTER — NON-APPOINTMENT (OUTPATIENT)
Age: 48
End: 2023-08-11

## 2023-08-11 ENCOUNTER — APPOINTMENT (OUTPATIENT)
Dept: OPHTHALMOLOGY | Facility: CLINIC | Age: 48
End: 2023-08-11
Payer: COMMERCIAL

## 2023-08-11 PROCEDURE — 92133 CPTRZD OPH DX IMG PST SGM ON: CPT

## 2023-08-11 PROCEDURE — 92004 COMPRE OPH EXAM NEW PT 1/>: CPT

## 2023-09-11 ENCOUNTER — APPOINTMENT (OUTPATIENT)
Dept: NEUROLOGY | Facility: CLINIC | Age: 48
End: 2023-09-11
Payer: COMMERCIAL

## 2023-09-11 VITALS
SYSTOLIC BLOOD PRESSURE: 118 MMHG | HEART RATE: 73 BPM | DIASTOLIC BLOOD PRESSURE: 81 MMHG | BODY MASS INDEX: 23.22 KG/M2 | WEIGHT: 136 LBS | OXYGEN SATURATION: 96 % | HEIGHT: 64 IN | TEMPERATURE: 97.3 F

## 2023-09-11 PROBLEM — R42 DIZZINESS: Status: ACTIVE | Noted: 2023-02-13

## 2023-09-11 PROBLEM — R29.818 TRANSIENT NEUROLOGICAL SYMPTOMS: Status: ACTIVE | Noted: 2023-01-23

## 2023-09-11 LAB
AMPA-R ABCBA: NEGATIVE
AMPHIPHYSIN IGG TITR SER IF: NEGATIVE
ANNOTATION COMMENT IMP: NORMAL
CASPR2-IGG CBA, S: NEGATIVE
CV2 IGG TITR SER: NEGATIVE
GABA-B ABCBA: NEGATIVE
GAD65 AB SER-MCNC: 0 NMOL/L
GLIAL NUC TYPE 1 AB TITR SER: NEGATIVE
HU1 AB TITR SER: NEGATIVE
HU2 AB TITR SER IF: NEGATIVE
HU3 AB TITR SER: NEGATIVE
IGLON5 IFA, S: NEGATIVE
IMMUNOLOGIST REVIEW: NORMAL
LGI1-IGG CBA, S: NEGATIVE
NIF IFA, S: NEGATIVE
NMDA-R ABCBA: NEGATIVE
PCA-1 AB TITR SER: NEGATIVE
PCA-2 AB TITR SER: NEGATIVE
PCA-TR AB TITR SER: NEGATIVE

## 2023-09-11 PROCEDURE — 99213 OFFICE O/P EST LOW 20 MIN: CPT

## 2023-09-11 RX ORDER — BUPROPION HYDROCHLORIDE 300 MG/1
300 TABLET, EXTENDED RELEASE ORAL DAILY
Refills: 0 | Status: ACTIVE | COMMUNITY

## 2023-09-11 RX ORDER — ESTRADIOL 0.1 MG/G
0.1 CREAM VAGINAL
Refills: 0 | Status: DISCONTINUED | COMMUNITY
End: 2023-09-11

## 2023-09-11 RX ORDER — BUPROPION HYDROCHLORIDE 150 MG/1
150 TABLET, EXTENDED RELEASE ORAL DAILY
Refills: 0 | Status: ACTIVE | COMMUNITY

## 2023-09-11 RX ORDER — LAMOTRIGINE 150 MG/1
150 TABLET ORAL DAILY
Refills: 0 | Status: ACTIVE | COMMUNITY

## 2023-09-11 RX ORDER — FINASTERIDE 1 MG/1
1 TABLET ORAL DAILY
Refills: 0 | Status: ACTIVE | COMMUNITY

## 2023-09-11 RX ORDER — MINOXIDIL 2.5 MG/1
2.5 TABLET ORAL DAILY
Refills: 0 | Status: ACTIVE | COMMUNITY

## 2023-09-11 RX ORDER — SPIRONOLACTONE 50 MG/1
50 TABLET ORAL
Refills: 0 | Status: ACTIVE | COMMUNITY

## 2023-09-11 RX ORDER — NORETHINDRONE ACETATE AND ETHINYL ESTRADIOL AND FERROUS FUMARATE 5-7-9-7
KIT ORAL DAILY
Refills: 0 | Status: ACTIVE | COMMUNITY

## 2023-11-06 ENCOUNTER — APPOINTMENT (OUTPATIENT)
Dept: ENDOCRINOLOGY | Facility: CLINIC | Age: 48
End: 2023-11-06

## 2024-01-08 ENCOUNTER — APPOINTMENT (OUTPATIENT)
Dept: NEUROLOGY | Facility: CLINIC | Age: 49
End: 2024-01-08
Payer: COMMERCIAL

## 2024-01-08 VITALS
WEIGHT: 138 LBS | HEART RATE: 72 BPM | OXYGEN SATURATION: 98 % | SYSTOLIC BLOOD PRESSURE: 118 MMHG | BODY MASS INDEX: 23.27 KG/M2 | HEIGHT: 64.5 IN | DIASTOLIC BLOOD PRESSURE: 78 MMHG

## 2024-01-08 PROCEDURE — 99213 OFFICE O/P EST LOW 20 MIN: CPT

## 2024-01-08 RX ORDER — LAMOTRIGINE 250 MG/1
250 TABLET, FILM COATED, EXTENDED RELEASE ORAL
Refills: 0 | Status: ACTIVE | COMMUNITY

## 2024-01-08 NOTE — HISTORY OF PRESENT ILLNESS
[FreeTextEntry1] : Interim Hx: 1/8/2024 12/26- Went to a sensory museum in [facility] 30 minutes into the experience felt like star filled dark pat. Felt speech and thinking slowed down (no one else noticed).  headache after  12/29- At a restaurant, went to bathroom. Saw pink with elephants. Next thing holding trash can in hand Started having trouble getting words- thoughts, words, speech slowed down.  calmed her down and feeling passed after an hour.  Did have stress/anxiety before going to [facility], felt a lot of family pressure and obligations ________________________ Interim Hx: 9/11/2023  Today went to ob and did recall breast exam was done. Went to dentist appoint at 12pm instead of 2pm. Looks at image and turns into moving   2 weeks ago was during a session could not remember what she was going to say, started feeling very anxious

## 2024-04-24 ENCOUNTER — APPOINTMENT (OUTPATIENT)
Dept: NEUROLOGY | Facility: CLINIC | Age: 49
End: 2024-04-24
Payer: COMMERCIAL

## 2024-04-24 PROCEDURE — 96132 NRPSYC TST EVAL PHYS/QHP 1ST: CPT

## 2024-04-24 PROCEDURE — 96139 PSYCL/NRPSYC TST TECH EA: CPT

## 2024-04-24 PROCEDURE — 96138 PSYCL/NRPSYC TECH 1ST: CPT

## 2024-04-24 PROCEDURE — 96133 NRPSYC TST EVAL PHYS/QHP EA: CPT

## 2024-04-24 PROCEDURE — 96116 NUBHVL XM PHYS/QHP 1ST HR: CPT

## 2024-05-13 ENCOUNTER — APPOINTMENT (OUTPATIENT)
Dept: NEUROLOGY | Facility: CLINIC | Age: 49
End: 2024-05-13
Payer: COMMERCIAL

## 2024-05-13 VITALS
WEIGHT: 138 LBS | DIASTOLIC BLOOD PRESSURE: 83 MMHG | OXYGEN SATURATION: 97 % | BODY MASS INDEX: 23.27 KG/M2 | SYSTOLIC BLOOD PRESSURE: 128 MMHG | HEART RATE: 85 BPM | HEIGHT: 64.5 IN | TEMPERATURE: 97 F

## 2024-05-13 DIAGNOSIS — Z87.19 PERSONAL HISTORY OF OTHER DISEASES OF THE DIGESTIVE SYSTEM: ICD-10-CM

## 2024-05-13 DIAGNOSIS — Z87.898 PERSONAL HISTORY OF OTHER SPECIFIED CONDITIONS: ICD-10-CM

## 2024-05-13 DIAGNOSIS — R41.3 OTHER AMNESIA: ICD-10-CM

## 2024-05-13 DIAGNOSIS — L65.9 NONSCARRING HAIR LOSS, UNSPECIFIED: ICD-10-CM

## 2024-05-13 PROCEDURE — 99214 OFFICE O/P EST MOD 30 MIN: CPT

## 2024-05-13 PROCEDURE — G2211 COMPLEX E/M VISIT ADD ON: CPT

## 2024-06-10 ENCOUNTER — NON-APPOINTMENT (OUTPATIENT)
Age: 49
End: 2024-06-10

## 2024-06-30 PROBLEM — R41.3 MEMORY CHANGES: Status: ACTIVE | Noted: 2023-06-29

## 2024-06-30 NOTE — PHYSICAL EXAM
[General Appearance - Alert] : alert [Oriented To Time, Place, And Person] : oriented to person, place, and time [Person] : oriented to person [Place] : oriented to place [Time] : oriented to time [Fluency] : fluency intact [Cranial Nerves Optic (II)] : visual acuity intact bilaterally,  visual fields full to confrontation, pupils equal round and reactive to light [Cranial Nerves Oculomotor (III)] : extraocular motion intact [Cranial Nerves Trigeminal (V)] : facial sensation intact symmetrically [Cranial Nerves Facial (VII)] : face symmetrical [Cranial Nerves Vestibulocochlear (VIII)] : hearing was intact bilaterally [Cranial Nerves Accessory (XI - Cranial And Spinal)] : head turning and shoulder shrug symmetric [Motor Tone] : muscle tone was normal in all four extremities [Motor Strength] : muscle strength was normal in all four extremities [Sensation Tactile Decrease] : light touch was intact [Abnormal Walk] : normal gait [Balance] : balance was intact [Coordination - Dysmetria Impaired Finger-to-Nose Bilateral] : not present

## 2024-06-30 NOTE — DISCUSSION/SUMMARY
[FreeTextEntry1] : 48 yo F with PMH of depression, anxiety, PTSD presents for follow up  Discussed considerations of PET scan and sleep study- at this time unclear if any benefit, for now will continue to monitor symptoms F/u in 4 months

## 2024-06-30 NOTE — HISTORY OF PRESENT ILLNESS
[FreeTextEntry1] : Interim Hx: 5/13/2024 Patient presents with  to review recent Neuropsych testing completed with Dr Burns on 4/24/2024. Summary- overall, this profile does not suggest a generalized decline. Most prominent deficits were noted in executive functions and language retrieval. Milder weakness were noted in working memory and aspects of memory . Though retention was generally WNL, there was occasional evidence of decay. Otherwise basic attention, processing speed, verbal initiation/fluency, most aspects of language, visuospatial processing, and nonverbal were intact. This profile is most consistent with frontal lobe weakness, given variability noted in memory and occasional decay, potential involvement of the temporal lobe should also be considered. Some suggestions included PET scan and sleep study (given patient reported excessive dreams immediately after sleeping).  Overall Patient feels stable. No new symptoms since last visit.

## 2024-07-22 ENCOUNTER — APPOINTMENT (OUTPATIENT)
Dept: NEUROLOGY | Facility: CLINIC | Age: 49
End: 2024-07-22
Payer: COMMERCIAL

## 2024-07-22 VITALS
WEIGHT: 138 LBS | TEMPERATURE: 98 F | BODY MASS INDEX: 23.27 KG/M2 | OXYGEN SATURATION: 97 % | HEART RATE: 86 BPM | HEIGHT: 64.5 IN | DIASTOLIC BLOOD PRESSURE: 86 MMHG | SYSTOLIC BLOOD PRESSURE: 131 MMHG

## 2024-07-22 DIAGNOSIS — R29.818 OTHER SYMPTOMS AND SIGNS INVOLVING THE NERVOUS SYSTEM: ICD-10-CM

## 2024-07-22 DIAGNOSIS — D33.3 BENIGN NEOPLASM OF CRANIAL NERVES: ICD-10-CM

## 2024-07-22 PROCEDURE — G2211 COMPLEX E/M VISIT ADD ON: CPT

## 2024-07-22 PROCEDURE — 99214 OFFICE O/P EST MOD 30 MIN: CPT

## 2024-07-23 NOTE — DISCUSSION/SUMMARY
[FreeTextEntry1] : 48 yo F with PMH of depression, anxiety, PTSD, right IAC vestibular schwannoma presents for follow up  MRI brain w/wout contrast to f/u vestibular schwannoma  Referral to CBT with Dr Shoen Advised to schedule an appointment with Dr Granger (cognitive neurologist) for expert opinion on cognitive issues Again discussed considerations of PET scan and sleep study- will hold off for now F/u in 3-4 months

## 2024-07-23 NOTE — HISTORY OF PRESENT ILLNESS
[FreeTextEntry1] : Interim Hx: 7/22/2024  Patient reports that recently had a few days of trouble finding words. Last Thursday she was with a client who asked her to "explain that further", which cause an increase in her anxiety. On Friday during the day she felt like she  was having trouble finding words and completing simple tasks. After 5 pm felt fatigued/depressed and anxiety was high In the evening. when she was was going to brush teeth, she picked up tooth paste and felt foreign sensation in right hand lasts 5-10 seconds and causes her to laugh. Has had similar symptoms in the past always triggering laughter.  She started to feel better after a few days  Due for repeat MRI brain to follow up right vestibular schwannoma

## 2024-07-23 NOTE — DISCUSSION/SUMMARY
[FreeTextEntry1] : 46 yo F with PMH of depression, anxiety, PTSD, right IAC vestibular schwannoma presents for follow up  MRI brain w/wout contrast to f/u vestibular schwannoma  Referral to CBT with Dr Shoen Advised to schedule an appointment with Dr Granger (cognitive neurologist) for expert opinion on cognitive issues Again discussed considerations of PET scan and sleep study- will hold off for now F/u in 3-4 months

## 2024-07-26 ENCOUNTER — RESULT REVIEW (OUTPATIENT)
Age: 49
End: 2024-07-26

## 2024-07-26 ENCOUNTER — TRANSCRIPTION ENCOUNTER (OUTPATIENT)
Age: 49
End: 2024-07-26

## 2024-07-26 DIAGNOSIS — R79.89 OTHER SPECIFIED ABNORMAL FINDINGS OF BLOOD CHEMISTRY: ICD-10-CM

## 2024-07-29 ENCOUNTER — OUTPATIENT (OUTPATIENT)
Dept: OUTPATIENT SERVICES | Facility: HOSPITAL | Age: 49
LOS: 1 days | End: 2024-07-29
Payer: COMMERCIAL

## 2024-07-29 ENCOUNTER — APPOINTMENT (OUTPATIENT)
Dept: MRI IMAGING | Facility: HOSPITAL | Age: 49
End: 2024-07-29

## 2024-07-29 PROCEDURE — 70553 MRI BRAIN STEM W/O & W/DYE: CPT | Mod: 26

## 2024-07-29 PROCEDURE — 70553 MRI BRAIN STEM W/O & W/DYE: CPT

## 2024-07-29 PROCEDURE — 70553 MRI BRAIN STEM W/O & W/DYE: CPT | Mod: 26,77

## 2024-07-29 PROCEDURE — A9585: CPT

## 2024-08-05 ENCOUNTER — APPOINTMENT (OUTPATIENT)
Dept: ENDOCRINOLOGY | Facility: CLINIC | Age: 49
End: 2024-08-05

## 2024-08-05 ENCOUNTER — APPOINTMENT (OUTPATIENT)
Dept: OTOLARYNGOLOGY | Facility: CLINIC | Age: 49
End: 2024-08-05

## 2024-08-05 PROBLEM — J34.89 SINUS PRESSURE: Status: ACTIVE | Noted: 2024-08-05

## 2024-08-05 PROBLEM — Z98.890 HISTORY OF ENDOSCOPIC SINUS SURGERY: Status: ACTIVE | Noted: 2024-08-05

## 2024-08-05 PROCEDURE — 99214 OFFICE O/P EST MOD 30 MIN: CPT

## 2024-08-05 PROCEDURE — 99215 OFFICE O/P EST HI 40 MIN: CPT | Mod: 25

## 2024-08-05 PROCEDURE — 31231 NASAL ENDOSCOPY DX: CPT

## 2024-08-05 NOTE — PROCEDURE
[FreeTextEntry6] : - Procedure Note    - Pre-operative Diagnosis: facial pressure  Post-operative Diagnosis: normal exam w/ evidence of sinus surgery  Anesthesia: Topical Procedure: Bilateral nasal endoscopy    Procedure Details:  After topical anesthesia and decongestant, the patient was placed in the supine position. The telescope was passed along the left nasal floor to the nasopharynx. It was then passed into the region of the middle meatus, middle turbinate, and the sphenoethmoid region.  An identical procedure was performed on the right side.     Findings:  Mucosa: 	                Postsurgical changes with widely open maxillary ostia, unable to fully assess ethmoid bilaterally Nasal septum: 	midline 	 Discharge: 	none	 Turbinates: 	normal	 Adenoid: 	                normal	 Posterior choanae: 	normal	 Eustachian tubes: 	normal	 Mucous stranding: 	normal 	 Lesions: 	                Not present	    Comments:  Condition: Stable. Patient tolerated procedure well. Complications: None

## 2024-08-05 NOTE — HISTORY OF PRESENT ILLNESS
[de-identified] : 2/13/23 47-year-old female who presents with concern for dizziness as well as tinnitus. Symptoms began in June/2022, during a therapy session she had findings including, confusion, loss to time and space as well as the feeling of electricity traveling from head to toes. She was ultimately seen at the [facility] emergency room with a stroke ruled out. The following week she began to have nonpulsatile tinnitus in the right ear as well as symptoms of dizziness.   She describes the dizziness as,  no ear, nose, throat symptoms otherwise.  Pt with known hx of: vertigo. PTSD, anxiety, depression   Patient seen by neurology, and diagnosed with a transient neurologic event. MRI/IAC completed with concern for vestibular schwannoma, 3 mm, on the right side. TIA work-up has been negative to date. EEG has been unremarkable. Further testing with carotid Doppler and echocardiogram to be completed. -  Interval History: 3/13/2023  the patient did complete Augmentin and notes complete resolution of headache type symptoms over the left frontal sinus. In terms of dizziness and tinnitus patient overall stable since last visit. She feels that some of her symptoms of dizziness have improved. She feels that the tinnitus is more prominent bilaterally. Has been following with neurosurgery with plan for repeat MRI in 6 months. Today did complete audiogram as well as VNG and presents to review those results.   patient is also concerned about some more prominence over the thyroid cartilage. Is not causing any issues chewing, eating, swallowing or voice changes. No breathing issues. She is concerned that she might have a thyroid issue. No ear, nose, throat symptoms otherwise.  - 4/10/23 Pt presents today to review thyroid US as well as concern for pressure around her eyes for 3 days. Denies drainage, nasal congestion, changes in sense of taste or smell. She has been using nasal saline irrigation. Denies difficulty chewing, eating or swallowing. No breathing issues. No voice changes. She has an appt in June with neurology. Dizziness has improved. No other ENT issues.  - [FreeTextEntry1] : 8/5/24:  Here for follow up on sinus pressure onset 4/2024. Complaining of forehead and maxillary pressure with nasal congestion and nasal drainage. Since last visit, these symptoms are becoming more persistent, happening every week about 3-4 days at a time. Able to control the discomfort with tylenol every 4-6 hours depending on how severe the pressure was. She has been using Flonase off and on over the last few months but feels no benefit. She uses saline sinus rinses every morning x 15 years, no purulent discharge being cleared. She had sinus surgery in 2008 and has not had these sinus pressure symptoms since before surgery.   She is due for thyroid surveillance with US. Last US 3/13/23. No issues eating, chewing, or swallowing. No food regurgitation. No breathing issues. No voice changes. No new ENT issues otherwise.  Her vestibular schwannoma is being followed by neurosurgery. Most recent MRI Brain 7/2024 reviewed noting stable 0.3 cm mass.

## 2024-08-05 NOTE — ASSESSMENT
[FreeTextEntry1] : - 47-year-old female who presents with concern for tinnitus and vertiginous symptoms. Examination today has been normal. At this time I am recommending audiogram, tympanogram, VNG. Patient with recent diagnosis of vestibular schwannoma on MRI. neurosurgery recommending follow-up in 6 months for repeat MRI. Audiogram essentially unremarkable with some evidence of high-frequency conductive loss with type A tympanogram bilaterally. VNG did have some findings consistent with a central ocular motor finding and I am recommending continued follow-up with neurology.   In terms of concern for sinusitis patient took Augmentin and notes complete resolution of symptoms. she will continue with nasal saline irrigation as needed and let me know if symptoms return at which point I would recommend formal CT sinus imaging.  In terms of concern for neck mass exam essentially normal. Will recommend ultrasound neck to evaluate for potential thyroglossal duct cyst or other lesion.  Patient presents today to review thyroid US which showed "normal size with few benign tiny spongiform nodules." Recommend annual thyroid US. Patient also with concern for recurrence of facial pressure. Exam normal today. I am recommending continued nasal saline irrigation. Otherwise plan unchanged, including fu with neurosurgery for vestibular schwannoma and neurology consultation for central ocular motor findings on VNG.   8/5/24: Complaining of 3-4 months of forehead and maxillary pressure with nasal congestion and nasal drainage, happening 3-4 days every week. Controlled mostly with tylenol every 4-6 hours. In terms of nasal treatments she is using sinus rinses QD since sinus surgery in 2008, and Flonase off and on over the last few months. Nasal endoscopy showing patent post surgical sinuses without purulence or polyps. I suspect this is more of a headache syndrome picture, however given her history, I am recommending one Augmentin 10 day course, Medrol Dosepak and follow up in 3-4 weeks. If still symptomatic, will recommend CT sinus. Continue saline sinus rinses.   She is due for thyroid surveillance with US. Last US 3/13/23. No issues eating, chewing, or swallowing. No food regurgitation. No breathing issues. No voice changes. No new ENT issues otherwise.  In terms of vestibular schwannoma, she is being followed by neurosurgery. Most recent MRI Brain 7/2024 reviewed noting stable 0.3 cm mass. Recommending she establish care with Dr. Mckeon, our neuro-otologist.   - Continue follow-up with neurosurgery - Recommending she establish care with Dr. Mckeon once he begins - Nasal saline irrigation, start Augmentin and Medrol Dosepak - Annual thyroid US ordered - Follow up in 3-4 weeks after thyroid US and medications

## 2024-08-06 PROBLEM — E04.1 THYROID NODULE: Status: ACTIVE | Noted: 2024-08-05

## 2024-08-06 NOTE — PHYSICAL EXAM
[Alert] : alert [Well Nourished] : well nourished [No Acute Distress] : no acute distress [Well Developed] : well developed [Normal Sclera/Conjunctiva] : normal sclera/conjunctiva [EOMI] : extra ocular movement intact [No Proptosis] : no proptosis [Normal Oropharynx] : the oropharynx was normal [Thyroid Not Enlarged] : the thyroid was not enlarged [No Thyroid Nodules] : no palpable thyroid nodules [No Accessory Muscle Use] : no accessory muscle use [Clear to Auscultation] : lungs were clear to auscultation bilaterally [Normal S1, S2] : normal S1 and S2 [Normal Rate] : heart rate was normal [Regular Rhythm] : with a regular rhythm [Pedal Pulses Normal] : the pedal pulses are present [Normal Bowel Sounds] : normal bowel sounds [Not Tender] : non-tender [Not Distended] : not distended [Soft] : abdomen soft [No Spinal Tenderness] : no spinal tenderness [Spine Straight] : spine straight [Normal Gait] : normal gait [Normal Strength/Tone] : muscle strength and tone were normal [No Rash] : no rash [Normal Reflexes] : deep tendon reflexes were 2+ and symmetric [No Tremors] : no tremors [Oriented x3] : oriented to person, place, and time [Acanthosis Nigricans] : no acanthosis nigricans [de-identified] : R lobe visualized upon swallowing, R Lobe palpated, no nodules, no mass L3 lymph node palpated

## 2024-08-06 NOTE — PHYSICAL EXAM
[Alert] : alert [Well Nourished] : well nourished [No Acute Distress] : no acute distress [Well Developed] : well developed [Normal Sclera/Conjunctiva] : normal sclera/conjunctiva [EOMI] : extra ocular movement intact [No Proptosis] : no proptosis [Normal Oropharynx] : the oropharynx was normal [Thyroid Not Enlarged] : the thyroid was not enlarged [No Thyroid Nodules] : no palpable thyroid nodules [No Accessory Muscle Use] : no accessory muscle use [Clear to Auscultation] : lungs were clear to auscultation bilaterally [Normal S1, S2] : normal S1 and S2 [Normal Rate] : heart rate was normal [Regular Rhythm] : with a regular rhythm [Pedal Pulses Normal] : the pedal pulses are present [Normal Bowel Sounds] : normal bowel sounds [Not Tender] : non-tender [Not Distended] : not distended [Soft] : abdomen soft [No Spinal Tenderness] : no spinal tenderness [Spine Straight] : spine straight [Normal Gait] : normal gait [Normal Strength/Tone] : muscle strength and tone were normal [No Rash] : no rash [Normal Reflexes] : deep tendon reflexes were 2+ and symmetric [No Tremors] : no tremors [Oriented x3] : oriented to person, place, and time [Acanthosis Nigricans] : no acanthosis nigricans [de-identified] : R lobe visualized upon swallowing, R Lobe palpated, no nodules, no mass L3 lymph node palpated

## 2024-08-06 NOTE — ADDENDUM
[FreeTextEntry1] : I, Daljit You act solely as a scribe for Dr. Carter Arechiga on this date 08/05/2024

## 2024-08-06 NOTE — END OF VISIT
[FreeTextEntry3] :  All medical record entries made by the Scribe were at my, Dr. Carter Arechiga, direction and personally dictated by me on 08/05/2024. I have reviewed the chart and agree that the record accurately reflects my personal performance of the history, physical exam, assessment and plan. I have also personally directed, reviewed and agreed with the chart. [Time Spent: ___ minutes] : I have spent [unfilled] minutes of time on the encounter.

## 2024-08-06 NOTE — REASON FOR VISIT
[Follow - Up] : a follow-up visit [Other___] : [unfilled] [FreeTextEntry2] : Dr. Sarah Quinonez [phone]

## 2024-08-06 NOTE — CONSULT LETTER
[Dear  ___] : Dear  [unfilled], [Consult Letter:] : I had the pleasure of evaluating your patient, [unfilled]. [Sincerely,] : Sincerely, [FreeTextEntry3] : Carter Arechiga

## 2024-08-06 NOTE — REASON FOR VISIT
[Follow - Up] : a follow-up visit [Other___] : [unfilled] [FreeTextEntry2] : Dr. Sarah Quinonez [phone]

## 2024-08-06 NOTE — HISTORY OF PRESENT ILLNESS
[FreeTextEntry1] : 48 year old F pt, with Hx of abnormal T3 levels  (~02/2023), referred by Dr. Sarah Quinonez (OBGYN), presents today to establish endocrine care.  Other PMHx: tinnitus, vestibular schwannoma, anxiety  PSHx: tonsillectomy (2008), deviated septum (2006) FHx: Father: Heart disease.  No FHx of: thyroid disorder Allergies: codeine, prazosin, benzonatate, promethazine  P0 PCP: Dr. 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 She endorses hair loss  08/05/2024  Pt has /81 and BMI 23.32. No significant weight change.  Pt initially presented on 05/01/24 with abnormal T3 and DHEA. A thyroid US from 03/20/23 reveals  2 R lobe subcentimeter spongiform nodules measuring 0.3 and 0.2 cm.  A review of her chart reveals that in 05/08/23, TFTs and DHEA were normal.  CC: "I'm doing well and feeling good." 02/20/23 DHEA 34.4, TSH 3.66, T3 Uptake 20% Pt states that she does not have any thyroid symptoms. She recently followed up with her ENT Dr. Davis, who is ordering another thyroid US.   [Medications verified as per pt on 08/05/2024] Current Medications: Wellbutrin 450 mg, Lamictal 25 mg, Spironolactone 25 mg, Minoxidil 2.5 mg, BuSpar PRN, Finasteride 1 mg, Propranolol 30 mg, Norethindrone-Ethinyl Estradiol 20 mcg  contraceptives

## 2024-08-06 NOTE — HISTORY OF PRESENT ILLNESS
[FreeTextEntry1] : 48 year old F pt, with Hx of abnormal T3 levels  (~02/2023), referred by Dr. Sarah Quinonez (OBGYN), presents today to establish endocrine care.  Other PMHx: tinnitus, vestibular schwannoma, anxiety  PSHx: tonsillectomy (2008), deviated septum (2006) FHx: Father: Heart disease.  No FHx of: thyroid disorder Allergies: codeine, prazosin, benzonatate, promethazine  P0 PCP: Dr. 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 She endorses hair loss  08/05/2024  Pt has /81 and BMI 23.32. No significant weight change.  Pt initially presented on 05/01/24 with abnormal T3 and DHEA. A thyroid US from 03/20/23 reveals  2 R lobe subcentimeter spongiform nodules measuring 0.3 and 0.2 cm.  A review of her chart reveals that in 05/08/23, TFTs and DHEA were normal.  CC: "I'm doing well and feeling good." 02/20/23 DHEA 34.4, TSH 3.66, T3 Uptake 20% Pt states that she does not have any thyroid symptoms. She recently followed up with her ENT Dr. Davis, who is ordering another thyroid US.   [Medications verified as per pt on 08/05/2024] Current Medications: Wellbutrin 450 mg, Lamictal 25 mg, Spironolactone 25 mg, Minoxidil 2.5 mg, BuSpar PRN, Finasteride 1 mg, Propranolol 30 mg, Norethindrone-Ethinyl Estradiol 20 mcg  contraceptives

## 2024-08-07 DIAGNOSIS — H74.8X2 OTHER SPECIFIED DISORDERS OF LEFT MIDDLE EAR AND MASTOID: ICD-10-CM

## 2024-08-07 DIAGNOSIS — R41.3 OTHER AMNESIA: ICD-10-CM

## 2024-08-07 DIAGNOSIS — D36.10 BENIGN NEOPLASM OF PERIPHERAL NERVES AND AUTONOMIC NERVOUS SYSTEM, UNSPECIFIED: ICD-10-CM

## 2024-08-07 DIAGNOSIS — J32.9 CHRONIC SINUSITIS, UNSPECIFIED: ICD-10-CM

## 2024-08-07 DIAGNOSIS — H61.891 OTHER SPECIFIED DISORDERS OF RIGHT EXTERNAL EAR: ICD-10-CM

## 2024-08-22 PROBLEM — F32.A DEPRESSION: Status: RESOLVED | Noted: 2023-03-01 | Resolved: 2024-08-22

## 2024-08-22 PROBLEM — F41.9 ANXIETY: Status: RESOLVED | Noted: 2023-03-01 | Resolved: 2024-08-22

## 2024-08-26 ENCOUNTER — APPOINTMENT (OUTPATIENT)
Dept: NEUROSURGERY | Facility: CLINIC | Age: 49
End: 2024-08-26
Payer: COMMERCIAL

## 2024-08-26 VITALS
HEIGHT: 64 IN | HEART RATE: 80 BPM | OXYGEN SATURATION: 98 % | RESPIRATION RATE: 18 BRPM | WEIGHT: 138 LBS | TEMPERATURE: 97.2 F | DIASTOLIC BLOOD PRESSURE: 70 MMHG | SYSTOLIC BLOOD PRESSURE: 111 MMHG | BODY MASS INDEX: 23.56 KG/M2

## 2024-08-26 DIAGNOSIS — Z78.9 OTHER SPECIFIED HEALTH STATUS: ICD-10-CM

## 2024-08-26 DIAGNOSIS — F32.A DEPRESSION, UNSPECIFIED: ICD-10-CM

## 2024-08-26 DIAGNOSIS — D33.3 BENIGN NEOPLASM OF CRANIAL NERVES: ICD-10-CM

## 2024-08-26 DIAGNOSIS — F41.9 ANXIETY DISORDER, UNSPECIFIED: ICD-10-CM

## 2024-08-26 PROCEDURE — 99215 OFFICE O/P EST HI 40 MIN: CPT

## 2024-08-26 NOTE — REASON FOR VISIT
[Follow-Up: _____] : a [unfilled] follow-up visit [FreeTextEntry1] : R vestibular schwannoma (managed by surveillance image only) returns for one year with new MRI brain IAC (PACS)  Today she reports one incidence of dizzy spell last Saturday (stayed for couple of hours) which got resolve next day and denies any other new/worsening focal neuro deficits.

## 2024-08-26 NOTE — HISTORY OF PRESENT ILLNESS
[> 3 months] : more  than 3 months [FreeTextEntry1] : vertigo, tinnitus [de-identified] : 48 yo F with PMH of depression, anxiety, PTSD who reports chronic dizziness, tinnitus on R ear. 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 He went [facility] ED at that time where CTH showed no bleeding/ischemic stroke. And she started to noticed intermittent ringing in the R ear with vertigo. She tried OTC Dramamine without relief. She was seen by neurology (Dr. Ortez) initially where MRI internal auditory cannel revealed 3mm R cochlear nerve schwannoma and referred to ENT. In trim, she had EEG, carotid artery US, nasal endoscopy test were completed which showed unremarkable result.   She reports recent ED visit ([facility] on 7/25/23) for sudden onset of L facial numbness/words finding/L side paresthesia. She was evaluated for TIA and CTA head/neck/MRI brain wo showed unremarkable images, neurology unlikely TIA, recommended psychology consult.  7/31/23 visit Today she denies recurrent symptoms but continues to c/o R sided tinnitus. She reports progressively worsening ringing in ear initially on R sided couple which initially started 2 weeks after ED visit in 6/2022 but noticed past 2 weeks of L sided as well. She states dizziness, palpitation, weakness, words finding has been improved.  audiogram/VNG showed unremarkable.  CTA head/neck and MRI brain IAC was reviewed which showed stable vestibular schwannoma otherwise unremarkable. Plan was made to repeat MRI brain IAC w/wo in one year and follow up after

## 2024-08-26 NOTE — DATA REVIEWED
[de-identified] : brain IAC w/wo on 7/29/24 in PACS ACC: [id number]     EXAM:  MR IAC ONLY [facility] IC   ORDERED BY: SHIRLEY AREVALO  PROCEDURE DATE:  07/29/2024    INTERPRETATION:  CLINICAL INDICATION: Memory changes.  TECHNIQUE:  MRI Brain and IAC (INTERNAL ACOUSTIC CANALS) with and without contrast. Postcontrast images were obtained after the administration of 7 cc of Gadavist.  COMPARISON: 1/30/2013.   FINDINGS:  CPA CISTERNS:  No nodular soft tissue mass is seen. 7TH AND 8TH NERVE COMPLEXES: Stable 0.3 cm enhancing mass in the midportion of the right internal auditory canal, most compatible with a cochlear nerve schwannoma. MEMBRANOUS LABYRINTH:   Normal. TYMPANOMASTOID CAVITIES: No abnormal signal. INTRACRANIAL STRUCTURES: Unremarkable. VISUALIZED SINUSES: Opacification of the left frontal sinus. SKULL BASE:   Unremarkable. MISCELLANEOUS: Partially empty sella.   IMPRESSION: *  Stable 0.3 cm enhancing mass in the midportion of the right internal auditory canal, most compatible with a cochlear nerve schwannoma. *  No evidence for acute territorial infarct, acute intracranial hemorrhage, or midline shift.  --- End of Report ---      ROMANA DAVIS MD; Attending Radiologist This document has been electronically signed. Jul 29 2024 11:36AM

## 2024-08-26 NOTE — ASSESSMENT
[FreeTextEntry1] : stable MRI.   PLAN - repeat MRI brain IAC w/wo in one year - f/u after images to review  I, Dr. Jaime Abraham, personally performed the evaluation and management (E/M) services for this established patient who presents today with (a) new problem(s)/exacerbation of (an) existing condition(s). That E/M includes conducting the clinically appropriate interval history &/or exam, assessing all new/exacerbated conditions, and establishing a new plan of care. Today, my JENNIFER, Hyunchu Penny-Gold, was here to observe my evaluation and management service for this new problem/exacerbated condition and follow the plan of care established by me going forward.

## 2024-08-26 NOTE — HISTORY OF PRESENT ILLNESS
[> 3 months] : more  than 3 months [FreeTextEntry1] : vertigo, tinnitus [de-identified] : 46 yo F with PMH of depression, anxiety, PTSD who reports chronic dizziness, tinnitus on R ear. 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 He went [facility] ED at that time where CTH showed no bleeding/ischemic stroke. And she started to noticed intermittent ringing in the R ear with vertigo. She tried OTC Dramamine without relief. She was seen by neurology (Dr. Ortez) initially where MRI internal auditory cannel revealed 3mm R cochlear nerve schwannoma and referred to ENT. In trim, she had EEG, carotid artery US, nasal endoscopy test were completed which showed unremarkable result.   She reports recent ED visit ([facility] on 7/25/23) for sudden onset of L facial numbness/words finding/L side paresthesia. She was evaluated for TIA and CTA head/neck/MRI brain wo showed unremarkable images, neurology unlikely TIA, recommended psychology consult.  7/31/23 visit Today she denies recurrent symptoms but continues to c/o R sided tinnitus. She reports progressively worsening ringing in ear initially on R sided couple which initially started 2 weeks after ED visit in 6/2022 but noticed past 2 weeks of L sided as well. She states dizziness, palpitation, weakness, words finding has been improved.  audiogram/VNG showed unremarkable.  CTA head/neck and MRI brain IAC was reviewed which showed stable vestibular schwannoma otherwise unremarkable. Plan was made to repeat MRI brain IAC w/wo in one year and follow up after

## 2024-08-26 NOTE — DATA REVIEWED
[de-identified] : brain IAC w/wo on 7/29/24 in PACS ACC: [id number]     EXAM:  MR IAC ONLY [facility] IC   ORDERED BY: SHIRLEY AREVALO  PROCEDURE DATE:  07/29/2024    INTERPRETATION:  CLINICAL INDICATION: Memory changes.  TECHNIQUE:  MRI Brain and IAC (INTERNAL ACOUSTIC CANALS) with and without contrast. Postcontrast images were obtained after the administration of 7 cc of Gadavist.  COMPARISON: 1/30/2013.   FINDINGS:  CPA CISTERNS:  No nodular soft tissue mass is seen. 7TH AND 8TH NERVE COMPLEXES: Stable 0.3 cm enhancing mass in the midportion of the right internal auditory canal, most compatible with a cochlear nerve schwannoma. MEMBRANOUS LABYRINTH:   Normal. TYMPANOMASTOID CAVITIES: No abnormal signal. INTRACRANIAL STRUCTURES: Unremarkable. VISUALIZED SINUSES: Opacification of the left frontal sinus. SKULL BASE:   Unremarkable. MISCELLANEOUS: Partially empty sella.   IMPRESSION: *  Stable 0.3 cm enhancing mass in the midportion of the right internal auditory canal, most compatible with a cochlear nerve schwannoma. *  No evidence for acute territorial infarct, acute intracranial hemorrhage, or midline shift.  --- End of Report ---      ROMANA DAVIS MD; Attending Radiologist This document has been electronically signed. Jul 29 2024 11:36AM

## 2024-09-09 ENCOUNTER — APPOINTMENT (OUTPATIENT)
Dept: OTOLARYNGOLOGY | Facility: CLINIC | Age: 49
End: 2024-09-09

## 2024-09-09 ENCOUNTER — APPOINTMENT (OUTPATIENT)
Dept: DERMATOLOGY | Facility: CLINIC | Age: 49
End: 2024-09-09
Payer: COMMERCIAL

## 2024-09-09 DIAGNOSIS — D22.9 MELANOCYTIC NEVI, UNSPECIFIED: ICD-10-CM

## 2024-09-09 DIAGNOSIS — L64.9 ANDROGENIC ALOPECIA, UNSPECIFIED: ICD-10-CM

## 2024-09-09 DIAGNOSIS — L81.9 DISORDER OF PIGMENTATION, UNSPECIFIED: ICD-10-CM

## 2024-09-09 DIAGNOSIS — Z12.83 ENCOUNTER FOR SCREENING FOR MALIGNANT NEOPLASM OF SKIN: ICD-10-CM

## 2024-09-09 PROCEDURE — 99204 OFFICE O/P NEW MOD 45 MIN: CPT

## 2024-09-09 RX ORDER — FINASTERIDE 5 MG/1
5 TABLET, FILM COATED ORAL
Qty: 15 | Refills: 5 | Status: ACTIVE | COMMUNITY
Start: 2024-09-09 | End: 1900-01-01

## 2024-09-09 RX ORDER — MINOXIDIL 2.5 MG/1
2.5 TABLET ORAL
Qty: 30 | Refills: 5 | Status: ACTIVE | COMMUNITY
Start: 2024-09-09 | End: 1900-01-01

## 2024-09-14 PROBLEM — D22.9 MULTIPLE BENIGN MELANOCYTIC NEVI: Status: ACTIVE | Noted: 2024-09-14

## 2024-09-14 PROBLEM — Z12.83 SCREENING EXAM FOR SKIN CANCER: Status: ACTIVE | Noted: 2024-09-14

## 2024-09-14 PROBLEM — L81.9 HYPERPIGMENTATION: Status: ACTIVE | Noted: 2024-09-14

## 2024-09-14 NOTE — ASSESSMENT
[FreeTextEntry1] : #AGA- chronic, progressive, not at treatment goal - Can stop topical hair stim as already on oral counterparts - C/w minoxidil 2.5mg daily, SED - Increase finasteride to 2.5mg daily, SED  #Hyperpigmentation  -SPF -Start compounded rx from [facility]: Hydroquinone 8%, Tretinoin 0.1%, Kojic Acid 1%, Niacinamide 4%, Fluocinolone 0.025% Cream, apply once daily only to affected areas. SED including but not limited to inefficacy, irritation, erythema, peeling, unwanted lightening of the skin, paradoxical darkening of the skin (exogenous ochronosis) with prolonged use, atrophy, hypopigmentation, telangiectasias and need for strict sun protection. Cosmetic nature of the medication and out of pocket expense discussed with the patient. Recommend using for 3 month trial then taking 1 month off. Discussed pregnancy contraindication. If patient develops irritation, instructed to stop medication and present for evaluation.  # Multiple melanocytic nevi, all benign appearing on today's exam -Sun protection was discussed. The proper use of broad spectrum UVB/UVA sunscreen with SPF 30 or greater was reviewed and the need for re-application after swimming or sweating or 2-3 hours was emphasized. We discussed judicious use of clothing and avoidance of peak periods of sun exposure. I made the patient aware of need for year-round protection. ABCDEs of melanoma reviewed. -Self-skin check also reviewed -Counseled pt to monitor for changes   # Screening for skin cancer -ABCDEs of melanoma, sun safety, and self-skin check reviewed -Counseled pt to notify us of any changing or concerning lesions -RTC 12 months, sooner prn

## 2024-09-14 NOTE — PHYSICAL EXAM
[FreeTextEntry3] :  AAOx3, NAD, well-appearing / pleasant Total body skin exam performed within normal limits with the exception of:  - Patient deferred examination of genitalia  Scalp with appropriate density though some miniaturization on dermoscopy  Hyperpigmented patches chest Fairly uniform and regular brown macules and papules on the trunk and extremities

## 2024-09-16 ENCOUNTER — APPOINTMENT (OUTPATIENT)
Dept: OTOLARYNGOLOGY | Facility: CLINIC | Age: 49
End: 2024-09-16
Payer: COMMERCIAL

## 2024-09-16 VITALS
WEIGHT: 145 LBS | BODY MASS INDEX: 24.75 KG/M2 | DIASTOLIC BLOOD PRESSURE: 93 MMHG | HEART RATE: 74 BPM | TEMPERATURE: 97.9 F | SYSTOLIC BLOOD PRESSURE: 137 MMHG | HEIGHT: 64 IN

## 2024-09-16 DIAGNOSIS — J32.9 CHRONIC SINUSITIS, UNSPECIFIED: ICD-10-CM

## 2024-09-16 DIAGNOSIS — D33.3 BENIGN NEOPLASM OF CRANIAL NERVES: ICD-10-CM

## 2024-09-16 PROCEDURE — 99205 OFFICE O/P NEW HI 60 MIN: CPT | Mod: 25

## 2024-09-16 PROCEDURE — 92504 EAR MICROSCOPY EXAMINATION: CPT

## 2024-09-16 PROCEDURE — 31231 NASAL ENDOSCOPY DX: CPT

## 2024-09-16 NOTE — HISTORY OF PRESENT ILLNESS
[de-identified] : NEUROTOLOGY NOTE Patient referred by Dr. Bertin Davis. I personally reviewed the external note from said physician. Dr. Davis's Notes: 2/13/23 47-year-old female who presents with concern for dizziness as well as tinnitus. Symptoms began in June/2022, during a therapy session she had findings including, confusion, loss to time and space as well as the feeling of electricity traveling from head to toes. She was ultimately seen at the [facility] emergency room with a stroke ruled out. The following week she began to have nonpulsatile tinnitus in the right ear as well as symptoms of dizziness.  She describes the dizziness as, no ear, nose, throat symptoms otherwise.  Pt with known hx of: vertigo. PTSD, anxiety, depression  Patient seen by neurology, and diagnosed with a transient neurologic event. MRI/IAC completed with concern for vestibular schwannoma, 3 mm, on the right side. TIA work-up has been negative to date. EEG has been unremarkable. Further testing with carotid Doppler and echocardiogram to be completed. - Interval History: 3/13/2023 the patient did complete Augmentin and notes complete resolution of headache type symptoms over the left frontal sinus. In terms of dizziness and tinnitus patient overall stable since last visit. She feels that some of her symptoms of dizziness have improved. She feels that the tinnitus is more prominent bilaterally. Has been following with neurosurgery with plan for repeat MRI in 6 months. Today did complete audiogram as well as VNG and presents to review those results.  patient is also concerned about some more prominence over the thyroid cartilage. Is not causing any issues chewing, eating, swallowing or voice changes. No breathing issues. She is concerned that she might have a thyroid issue. No ear, nose, throat symptoms otherwise. - 4/10/23 Pt presents today to review thyroid US as well as concern for pressure around her eyes for 3 days. Denies drainage, nasal congestion, changes in sense of taste or smell. She has been using nasal saline irrigation. Denies difficulty chewing, eating or swallowing. No breathing issues. No voice changes. She has an appt in June with neurology. Dizziness has improved. No other ENT issues. -   Interval History: 8/5/24: Here for follow up on sinus pressure onset 4/2024. Complaining of forehead and maxillary pressure with nasal congestion and nasal drainage. Since last visit, these symptoms are becoming more persistent, happening every week about 3-4 days at a time. Able to control the discomfort with tylenol every 4-6 hours depending on how severe the pressure was. She has been using Flonase off and on over the last few months but feels no benefit. She uses saline sinus rinses every morning x 15 years, no purulent discharge being cleared. She had sinus surgery in 2008 and has not had these sinus pressure symptoms since before surgery.  She is due for thyroid surveillance with US. Last US 3/13/23. No issues eating, chewing, or swallowing. No food regurgitation. No breathing issues. No voice changes. No new ENT issues otherwise.  Her vestibular schwannoma is being followed by neurosurgery. Most recent MRI Brain 7/2024 reviewed noting stable 0.3 cm mass.  Dr. Sandoval's Notes 9/16/24: The patient presents with a known right vestibular schwannoma. Her symptoms include dizziness, bilateral tinnitus, confusion. Audiogram and VNG have been performed. She has been experiencing dizziness since 2017 and feels pressure in her head along with the environment moving around her when he moves her head. Episodes occur randomly approximately once per month. She reports nausea with dizziness but denies vomiting. She has a hx of migraines that subsided after undergoing sinus surgery 10-15 years ago. She endorses occasional congestion, purulent rhinorrhea, bilateral facial pain/pressure. No anosmia/hyposmia.

## 2024-09-16 NOTE — DATA REVIEWED
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 *  No evidence for acute territorial infarct, acute intracranial hemorrhage, or midline shift.  [de-identified] : VNG 3/13/23: Calorics within normal limits. Eye movement recording in the pursuit tests showed low gain and asymmetry. This is a central ocular motor finding.

## 2024-09-16 NOTE — DATA REVIEWED
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 *  No evidence for acute territorial infarct, acute intracranial hemorrhage, or midline shift.  [de-identified] : VNG 3/13/23: Calorics within normal limits. Eye movement recording in the pursuit tests showed low gain and asymmetry. This is a central ocular motor finding.

## 2024-09-16 NOTE — PHYSICAL EXAM
[TextEntry] : General: AAO, no significant distress Psychiatric: affect pleasant and within normal limits Eyes: relatively symmetric, no obvious nystagmus Skin: no significant lesions on face Nose: no significant lesions; patent. Oral Cavity & Oropharynx: no significant deformity or lesions Neck/Lymphatics: no significant masses or abnormal cervical nodes palpated Respiratory: breathing comfortably; no significant distress Neurologic: cranial nerves II-XII grossly intact; EOMI Facial function: symmetric   Ear examination performed under binocular otologic microscope: Left Ear External: Pinna and periauricular area is normal. Canal: Ear canal skin is not inflamed or edematous. Tympanic Membrane: Intact and in good position.   Right Ear External: Pinna and periauricular area is normal. Canal: Ear canal skin is not inflamed or edematous. Tympanic Membrane: Intact and in good position.   Nasal Endoscopy: Pre-operative Diagnosis: chronic sinusitis Post-operative Diagnosis: chronic sinusitis Anesthesia: None Procedure: Bilateral nasal endoscopy Procedure Details: The patient was placed in the seated position sitting straight up. The telescope was passed along the left nasal floor to the nasopharynx. It was then passed into the region of the middle meatus, middle turbinate, and the sphenoethmoid region. An identical procedure was performed on the right side. Findings: Mucosa: thickened, slightly erythematous Nasal septum: normal Discharge: secretions from right frontal sinus, not purulent Turbinates: normal Adenoid: normal Posterior choanae: normal Eustachian tubes: normal Mucous stranding: normal Lesions: Not present  Comments:  Condition: Stable. Patient tolerated procedure well.

## 2024-09-16 NOTE — HISTORY OF PRESENT ILLNESS
[de-identified] : NEUROTOLOGY NOTE Patient referred by Dr. Bertin Davis. I personally reviewed the external note from said physician. Dr. Davis's Notes: 2/[birth year] 47-year-old female who presents with concern for dizziness as well as tinnitus. Symptoms began in June/2022, during a therapy session she had findings including, confusion, loss to time and space as well as the feeling of electricity traveling from head to toes. She was ultimately seen at the [facility] emergency room with a stroke ruled out. The following week she began to have nonpulsatile tinnitus in the right ear as well as symptoms of dizziness.  She describes the dizziness as, no ear, nose, throat symptoms otherwise.  Pt with known hx of: vertigo. PTSD, anxiety, depression  Patient seen by neurology, and diagnosed with a transient neurologic event. MRI/IAC completed with concern for vestibular schwannoma, 3 mm, on the right side. TIA work-up has been negative to date. EEG has been unremarkable. Further testing with carotid Doppler and echocardiogram to be completed. - Interval History: 3/13/2023 the patient did complete Augmentin and notes complete resolution of headache type symptoms over the left frontal sinus. In terms of dizziness and tinnitus patient overall stable since last visit. She feels that some of her symptoms of dizziness have improved. She feels that the tinnitus is more prominent bilaterally. Has been following with neurosurgery with plan for repeat MRI in 6 months. Today did complete audiogram as well as VNG and presents to review those results.  patient is also concerned about some more prominence over the thyroid cartilage. Is not causing any issues chewing, eating, swallowing or voice changes. No breathing issues. She is concerned that she might have a thyroid issue. No ear, nose, throat symptoms otherwise. - 4/10/23 Pt presents today to review thyroid US as well as concern for pressure around her eyes for 3 days. Denies drainage, nasal congestion, changes in sense of taste or smell. She has been using nasal saline irrigation. Denies difficulty chewing, eating or swallowing. No breathing issues. No voice changes. She has an appt in June with neurology. Dizziness has improved. No other ENT issues. -   Interval History: 8/5/24: Here for follow up on sinus pressure onset 4/2024. Complaining of forehead and maxillary pressure with nasal congestion and nasal drainage. Since last visit, these symptoms are becoming more persistent, happening every week about 3-4 days at a time. Able to control the discomfort with tylenol every 4-6 hours depending on how severe the pressure was. She has been using Flonase off and on over the last few months but feels no benefit. She uses saline sinus rinses every morning x 15 years, no purulent discharge being cleared. She had sinus surgery in 2008 and has not had these sinus pressure symptoms since before surgery.  She is due for thyroid surveillance with US. Last US 3/13/23. No issues eating, chewing, or swallowing. No food regurgitation. No breathing issues. No voice changes. No new ENT issues otherwise.  Her vestibular schwannoma is being followed by neurosurgery. Most recent MRI Brain 7/2024 reviewed noting stable 0.3 cm mass.  Dr. Sandoval's Notes 9/16/24: The patient presents with a known right vestibular schwannoma. Her symptoms include dizziness, bilateral tinnitus, confusion. Audiogram and VNG have been performed. She has been experiencing dizziness since 2017 and feels pressure in her head along with the environment moving around her when he moves her head. Episodes occur randomly approximately once per month. She reports nausea with dizziness but denies vomiting. She has a hx of migraines that subsided after undergoing sinus surgery 10-15 years ago. She endorses occasional congestion, purulent rhinorrhea, bilateral facial pain/pressure. No anosmia/hyposmia.

## 2024-09-23 ENCOUNTER — NON-APPOINTMENT (OUTPATIENT)
Age: 49
End: 2024-09-23

## 2024-09-27 ENCOUNTER — NON-APPOINTMENT (OUTPATIENT)
Age: 49
End: 2024-09-27

## 2024-09-27 PROBLEM — H02.409 PTOSIS OF EYELID, UNSPECIFIED LATERALITY: Status: ACTIVE | Noted: 2024-09-27

## 2024-09-30 ENCOUNTER — APPOINTMENT (OUTPATIENT)
Dept: OTOLARYNGOLOGY | Facility: CLINIC | Age: 49
End: 2024-09-30
Payer: COMMERCIAL

## 2024-09-30 VITALS
TEMPERATURE: 97.9 F | HEART RATE: 84 BPM | WEIGHT: 142.2 LBS | SYSTOLIC BLOOD PRESSURE: 133 MMHG | HEIGHT: 64 IN | DIASTOLIC BLOOD PRESSURE: 92 MMHG | BODY MASS INDEX: 24.28 KG/M2

## 2024-09-30 DIAGNOSIS — G44.209 TENSION-TYPE HEADACHE, UNSPECIFIED, NOT INTRACTABLE: ICD-10-CM

## 2024-09-30 DIAGNOSIS — H02.402 UNSPECIFIED PTOSIS OF LEFT EYELID: ICD-10-CM

## 2024-09-30 DIAGNOSIS — D33.3 BENIGN NEOPLASM OF CRANIAL NERVES: ICD-10-CM

## 2024-09-30 PROCEDURE — 99213 OFFICE O/P EST LOW 20 MIN: CPT

## 2024-09-30 NOTE — HISTORY OF PRESENT ILLNESS
[de-identified] : NEUROTOLOGY NOTE Patient referred by Dr. Bertin Davis. I personally reviewed the external note from said physician. Dr. Davis's Notes: 2/[birth year] 47-year-old female who presents with concern for dizziness as well as tinnitus. Symptoms began in June/2022, during a therapy session she had findings including, confusion, loss to time and space as well as the feeling of electricity traveling from head to toes. She was ultimately seen at the [facility] emergency room with a stroke ruled out. The following week she began to have nonpulsatile tinnitus in the right ear as well as symptoms of dizziness.  She describes the dizziness as, no ear, nose, throat symptoms otherwise.  Pt with known hx of: vertigo. PTSD, anxiety, depression  Patient seen by neurology, and diagnosed with a transient neurologic event. MRI/IAC completed with concern for vestibular schwannoma, 3 mm, on the right side. TIA work-up has been negative to date. EEG has been unremarkable. Further testing with carotid Doppler and echocardiogram to be completed. - Interval History: 3/13/2023 the patient did complete Augmentin and notes complete resolution of headache type symptoms over the left frontal sinus. In terms of dizziness and tinnitus patient overall stable since last visit. She feels that some of her symptoms of dizziness have improved. She feels that the tinnitus is more prominent bilaterally. Has been following with neurosurgery with plan for repeat MRI in 6 months. Today did complete audiogram as well as VNG and presents to review those results.  patient is also concerned about some more prominence over the thyroid cartilage. Is not causing any issues chewing, eating, swallowing or voice changes. No breathing issues. She is concerned that she might have a thyroid issue. No ear, nose, throat symptoms otherwise. - 4/10/23 Pt presents today to review thyroid US as well as concern for pressure around her eyes for 3 days. Denies drainage, nasal congestion, changes in sense of taste or smell. She has been using nasal saline irrigation. Denies difficulty chewing, eating or swallowing. No breathing issues. No voice changes. She has an appt in June with neurology. Dizziness has improved. No other ENT issues. -   Interval History: 8/5/24: Here for follow up on sinus pressure onset 4/2024. Complaining of forehead and maxillary pressure with nasal congestion and nasal drainage. Since last visit, these symptoms are becoming more persistent, happening every week about 3-4 days at a time. Able to control the discomfort with tylenol every 4-6 hours depending on how severe the pressure was. She has been using Flonase off and on over the last few months but feels no benefit. She uses saline sinus rinses every morning x 15 years, no purulent discharge being cleared. She had sinus surgery in 2008 and has not had these sinus pressure symptoms since before surgery.  She is due for thyroid surveillance with US. Last US 3/13/23. No issues eating, chewing, or swallowing. No food regurgitation. No breathing issues. No voice changes. No new ENT issues otherwise.  Her vestibular schwannoma is being followed by neurosurgery. Most recent MRI Brain 7/2024 reviewed noting stable 0.3 cm mass.  Dr. Sandoval's Notes 9/16/24: The patient presents with a known right vestibular schwannoma. Her symptoms include dizziness, bilateral tinnitus, confusion. Audiogram and VNG have been performed. She has been experiencing dizziness since 2017 and feels pressure in her head along with the environment moving around her when he moves her head. Episodes occur randomly approximately once per month. She reports nausea with dizziness but denies vomiting. She has a hx of migraines that subsided after undergoing sinus surgery 10-15 years ago. She endorses occasional congestion, purulent rhinorrhea, bilateral facial pain/pressure. No anosmia/hyposmia.  9/30/24: F/u for headaches/migraines. She states that they started a few days after she last saw me. The headache feels like a tension headache and spreads to the back of her head and neck. She is also dealing with a slightly droopy left eyelid. She is seeing her neurologist tomorrow.

## 2024-09-30 NOTE — HISTORY OF PRESENT ILLNESS
[de-identified] : NEUROTOLOGY NOTE Patient referred by Dr. Bertin Davis. I personally reviewed the external note from said physician. Dr. Davis's Notes: 2/13/23 47-year-old female who presents with concern for dizziness as well as tinnitus. Symptoms began in June/2022, during a therapy session she had findings including, confusion, loss to time and space as well as the feeling of electricity traveling from head to toes. She was ultimately seen at the [facility] emergency room with a stroke ruled out. The following week she began to have nonpulsatile tinnitus in the right ear as well as symptoms of dizziness.  She describes the dizziness as, no ear, nose, throat symptoms otherwise.  Pt with known hx of: vertigo. PTSD, anxiety, depression  Patient seen by neurology, and diagnosed with a transient neurologic event. MRI/IAC completed with concern for vestibular schwannoma, 3 mm, on the right side. TIA work-up has been negative to date. EEG has been unremarkable. Further testing with carotid Doppler and echocardiogram to be completed. - Interval History: 3/13/2023 the patient did complete Augmentin and notes complete resolution of headache type symptoms over the left frontal sinus. In terms of dizziness and tinnitus patient overall stable since last visit. She feels that some of her symptoms of dizziness have improved. She feels that the tinnitus is more prominent bilaterally. Has been following with neurosurgery with plan for repeat MRI in 6 months. Today did complete audiogram as well as VNG and presents to review those results.  patient is also concerned about some more prominence over the thyroid cartilage. Is not causing any issues chewing, eating, swallowing or voice changes. No breathing issues. She is concerned that she might have a thyroid issue. No ear, nose, throat symptoms otherwise. - 4/10/23 Pt presents today to review thyroid US as well as concern for pressure around her eyes for 3 days. Denies drainage, nasal congestion, changes in sense of taste or smell. She has been using nasal saline irrigation. Denies difficulty chewing, eating or swallowing. No breathing issues. No voice changes. She has an appt in June with neurology. Dizziness has improved. No other ENT issues. -   Interval History: 8/5/24: Here for follow up on sinus pressure onset 4/2024. Complaining of forehead and maxillary pressure with nasal congestion and nasal drainage. Since last visit, these symptoms are becoming more persistent, happening every week about 3-4 days at a time. Able to control the discomfort with tylenol every 4-6 hours depending on how severe the pressure was. She has been using Flonase off and on over the last few months but feels no benefit. She uses saline sinus rinses every morning x 15 years, no purulent discharge being cleared. She had sinus surgery in 2008 and has not had these sinus pressure symptoms since before surgery.  She is due for thyroid surveillance with US. Last US 3/13/23. No issues eating, chewing, or swallowing. No food regurgitation. No breathing issues. No voice changes. No new ENT issues otherwise.  Her vestibular schwannoma is being followed by neurosurgery. Most recent MRI Brain 7/2024 reviewed noting stable 0.3 cm mass.  Dr. Sandoval's Notes 9/16/24: The patient presents with a known right vestibular schwannoma. Her symptoms include dizziness, bilateral tinnitus, confusion. Audiogram and VNG have been performed. She has been experiencing dizziness since 2017 and feels pressure in her head along with the environment moving around her when he moves her head. Episodes occur randomly approximately once per month. She reports nausea with dizziness but denies vomiting. She has a hx of migraines that subsided after undergoing sinus surgery 10-15 years ago. She endorses occasional congestion, purulent rhinorrhea, bilateral facial pain/pressure. No anosmia/hyposmia.  9/30/24: F/u for headaches/migraines. She states that they started a few days after she last saw me. The headache feels like a tension headache and spreads to the back of her head and neck. She is also dealing with a slightly droopy left eyelid. She is seeing her neurologist tomorrow.

## 2024-09-30 NOTE — DATA REVIEWED
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 *  No evidence for acute territorial infarct, acute intracranial hemorrhage, or midline shift.  [de-identified] : VNG 3/13/23: Calorics within normal limits. Eye movement recording in the pursuit tests showed low gain and asymmetry. This is a central ocular motor finding.  Labs

## 2024-09-30 NOTE — DATA REVIEWED
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 *  No evidence for acute territorial infarct, acute intracranial hemorrhage, or midline shift.  [de-identified] : VNG 3/13/23: Calorics within normal limits. Eye movement recording in the pursuit tests showed low gain and asymmetry. This is a central ocular motor finding.

## 2024-09-30 NOTE — PHYSICAL EXAM
[TextEntry] : General: AAO, no significant distress Psychiatric: affect pleasant and within normal limits Eyes: relatively symmetric, no obvious nystagmus. Left eyelid slightly droopy compared with right eyelid Skin: no significant lesions on face Nose: no significant lesions; patent. Oral Cavity & Oropharynx: no significant deformity or lesions Neck/Lymphatics: no significant masses or abnormal cervical nodes palpated Respiratory: breathing comfortably; no significant distress Neurologic: cranial nerves II-XII grossly intact; EOMI Facial function: symmetric   Ear examination performed under binocular otologic microscope: Left Ear External: Pinna and periauricular area is normal. Canal: Ear canal skin is not inflamed or edematous. Tympanic Membrane: Intact and in good position.   Right Ear External: Pinna and periauricular area is normal. Canal: Ear canal skin is not inflamed or edematous. Tympanic Membrane: Intact and in good position.

## 2024-10-01 ENCOUNTER — NON-APPOINTMENT (OUTPATIENT)
Age: 49
End: 2024-10-01

## 2024-10-01 ENCOUNTER — APPOINTMENT (OUTPATIENT)
Dept: NEUROLOGY | Facility: CLINIC | Age: 49
End: 2024-10-01
Payer: COMMERCIAL

## 2024-10-01 VITALS
SYSTOLIC BLOOD PRESSURE: 126 MMHG | DIASTOLIC BLOOD PRESSURE: 84 MMHG | WEIGHT: 142 LBS | BODY MASS INDEX: 24.24 KG/M2 | OXYGEN SATURATION: 100 % | TEMPERATURE: 97.7 F | HEIGHT: 64 IN | HEART RATE: 75 BPM

## 2024-10-01 DIAGNOSIS — H02.409 UNSPECIFIED PTOSIS OF UNSPECIFIED EYELID: ICD-10-CM

## 2024-10-01 DIAGNOSIS — R51.9 HEADACHE, UNSPECIFIED: ICD-10-CM

## 2024-10-01 LAB
CRP SERPL-MCNC: <3 MG/L
ERYTHROCYTE [SEDIMENTATION RATE] IN BLOOD BY WESTERGREN METHOD: < 2 MM/HR

## 2024-10-01 PROCEDURE — G2211 COMPLEX E/M VISIT ADD ON: CPT

## 2024-10-01 PROCEDURE — 99214 OFFICE O/P EST MOD 30 MIN: CPT

## 2024-10-02 LAB — ANA SER IF-ACNC: NEGATIVE

## 2024-10-02 NOTE — DISCUSSION/SUMMARY
[FreeTextEntry1] : New onset intense headache with left eyelid droop Will need to r/o dissection or intracranial aneurysm  CTA H/N w/wout contrast MG blood work sent- awaiting results  Scheduled to see Dr Granger for cognitive issues/neuropsych results

## 2024-10-02 NOTE — HISTORY OF PRESENT ILLNESS
[FreeTextEntry1] : Interim Hx: 10/2/2024  Patient presents today for a new symptom 9/19- woke up had intense headache and felt nauseas (has not had headache like this for 10 years). Also noticed left sided eyelid droop that progressed during the day. Headache predominantly in frontal region and top of head. Also had neck pain.  Around 3 pm threw up and headache got worse. Was unable to work that day The next day headache was manageable and was able to work. Continue to have left eyelid droop. By Tuesday 9/24 headache improved but still had eyelid droop. Went to Urgent Care and was advised to be worked up for MG and MS/rheum conditions. Left droop improved in a few days but still mildly present. No blurry vision, no painful eye movement. No neck trauma

## 2024-10-02 NOTE — PHYSICAL EXAM
[General Appearance - Alert] : alert [General Appearance - In No Acute Distress] : in no acute distress [Oriented To Time, Place, And Person] : oriented to person, place, and time [Person] : oriented to person [Place] : oriented to place [Time] : oriented to time [Fluency] : fluency intact [Cranial Nerves Optic (II)] : visual acuity intact bilaterally,  visual fields full to confrontation, pupils equal round and reactive to light [Cranial Nerves Oculomotor (III)] : extraocular motion intact [Cranial Nerves Trigeminal (V)] : facial sensation intact symmetrically [Cranial Nerves Facial (VII)] : face symmetrical [Cranial Nerves Vestibulocochlear (VIII)] : hearing was intact bilaterally [Cranial Nerves Glossopharyngeal (IX)] : tongue and palate midline [Cranial Nerves Accessory (XI - Cranial And Spinal)] : head turning and shoulder shrug symmetric [Cranial Nerves Hypoglossal (XII)] : there was no tongue deviation with protrusion [Motor Tone] : muscle tone was normal in all four extremities [Motor Strength] : muscle strength was normal in all four extremities [Sensation Tactile Decrease] : light touch was intact [FreeTextEntry5] : very mild left eyelid droop- not fatigable

## 2024-10-02 NOTE — HISTORY OF PRESENT ILLNESS
[FreeTextEntry1] : Interim Hx: 10/2/2024  Patient presents today for a new symptom 9/19- woke up had intense headache and felt nauseas (has not had headache like this for 10 years). Also noticed left sided eyelid droop that progressed during the day. Headache predominantly in frontal region and top of head. Also had neck pain.  Around 3 pm threw up and headache got worse. Was unable to work that day The next day headache was manageable and was able to work. Continue to have left eyelid droop. By Tuesday 9/24 headache improved but still had eyelid droop. Went to Urgent Care and was advised to be worked up for MG and MS/rheum conditions. Left droop improved in a few days but still mildly present. No blurry vision, no painful eye movement. No neck trauma 25-Aug-2023

## 2024-10-03 LAB
ACHR BLOCK AB SER QL: 14 %
ACRM BINDING ANTIBODY: <0.03 NMOL/L

## 2024-10-04 LAB — ACHR MOD AB SER-ACNC: 12 %

## 2024-10-08 ENCOUNTER — APPOINTMENT (OUTPATIENT)
Dept: OTOLARYNGOLOGY | Facility: CLINIC | Age: 49
End: 2024-10-08
Payer: COMMERCIAL

## 2024-10-08 VITALS — WEIGHT: 140 LBS | BODY MASS INDEX: 23.9 KG/M2 | HEIGHT: 64 IN

## 2024-10-08 DIAGNOSIS — R51.9 HEADACHE, UNSPECIFIED: ICD-10-CM

## 2024-10-08 PROCEDURE — 99213 OFFICE O/P EST LOW 20 MIN: CPT | Mod: 25

## 2024-10-08 PROCEDURE — 31231 NASAL ENDOSCOPY DX: CPT | Mod: 52

## 2024-10-09 PROBLEM — R51.9 FRONTAL HEADACHE: Status: ACTIVE | Noted: 2024-10-09

## 2024-10-10 ENCOUNTER — OUTPATIENT (OUTPATIENT)
Dept: OUTPATIENT SERVICES | Facility: HOSPITAL | Age: 49
LOS: 1 days | End: 2024-10-10
Payer: COMMERCIAL

## 2024-10-10 ENCOUNTER — APPOINTMENT (OUTPATIENT)
Dept: CT IMAGING | Facility: HOSPITAL | Age: 49
End: 2024-10-10

## 2024-10-10 PROCEDURE — 70498 CT ANGIOGRAPHY NECK: CPT | Mod: 26

## 2024-10-10 PROCEDURE — 70496 CT ANGIOGRAPHY HEAD: CPT

## 2024-10-10 PROCEDURE — 70496 CT ANGIOGRAPHY HEAD: CPT | Mod: 26

## 2024-10-10 PROCEDURE — 70498 CT ANGIOGRAPHY NECK: CPT

## 2024-10-11 ENCOUNTER — TRANSCRIPTION ENCOUNTER (OUTPATIENT)
Age: 49
End: 2024-10-11

## 2024-10-17 ENCOUNTER — TRANSCRIPTION ENCOUNTER (OUTPATIENT)
Age: 49
End: 2024-10-17

## 2024-10-18 ENCOUNTER — TRANSCRIPTION ENCOUNTER (OUTPATIENT)
Age: 49
End: 2024-10-18

## 2024-10-18 RX ORDER — BUTALBITAL, ACETAMINOPHEN AND CAFFEINE 325; 50; 40 MG/1; MG/1; MG/1
50-325-40 TABLET ORAL
Qty: 20 | Refills: 3 | Status: ACTIVE | COMMUNITY
Start: 2024-10-18 | End: 1900-01-01

## 2024-10-21 ENCOUNTER — APPOINTMENT (OUTPATIENT)
Dept: NEUROLOGY | Facility: CLINIC | Age: 49
End: 2024-10-21

## 2024-10-21 VITALS
HEART RATE: 88 BPM | WEIGHT: 141.6 LBS | SYSTOLIC BLOOD PRESSURE: 135 MMHG | OXYGEN SATURATION: 98 % | BODY MASS INDEX: 24.31 KG/M2 | TEMPERATURE: 98.1 F | DIASTOLIC BLOOD PRESSURE: 85 MMHG

## 2024-10-21 DIAGNOSIS — C50.919 MALIGNANT NEOPLASM OF UNSPECIFIED SITE OF UNSPECIFIED FEMALE BREAST: ICD-10-CM

## 2024-10-21 PROCEDURE — 99205 OFFICE O/P NEW HI 60 MIN: CPT

## 2024-10-22 ENCOUNTER — TRANSCRIPTION ENCOUNTER (OUTPATIENT)
Age: 49
End: 2024-10-22

## 2024-10-24 ENCOUNTER — TRANSCRIPTION ENCOUNTER (OUTPATIENT)
Age: 49
End: 2024-10-24

## 2024-11-11 ENCOUNTER — APPOINTMENT (OUTPATIENT)
Dept: NEUROLOGY | Facility: CLINIC | Age: 49
End: 2024-11-11

## 2024-11-11 PROCEDURE — 95816 EEG AWAKE AND DROWSY: CPT

## 2024-11-12 ENCOUNTER — APPOINTMENT (OUTPATIENT)
Dept: NEUROLOGY | Facility: CLINIC | Age: 49
End: 2024-11-12

## 2024-11-12 PROCEDURE — 95708 EEG WO VID EA 12-26HR UNMNTR: CPT

## 2024-11-12 PROCEDURE — 95719 EEG PHYS/QHP EA INCR W/O VID: CPT

## 2024-11-12 PROCEDURE — 95700 EEG CONT REC W/VID EEG TECH: CPT

## 2024-11-18 ENCOUNTER — APPOINTMENT (OUTPATIENT)
Dept: MRI IMAGING | Facility: HOSPITAL | Age: 49
End: 2024-11-18

## 2024-11-18 ENCOUNTER — OUTPATIENT (OUTPATIENT)
Dept: OUTPATIENT SERVICES | Facility: HOSPITAL | Age: 49
LOS: 1 days | End: 2024-11-18
Payer: COMMERCIAL

## 2024-11-18 PROCEDURE — 70553 MRI BRAIN STEM W/O & W/DYE: CPT

## 2024-11-18 PROCEDURE — A9585: CPT

## 2024-11-18 PROCEDURE — 70553 MRI BRAIN STEM W/O & W/DYE: CPT | Mod: 26

## 2024-12-05 ENCOUNTER — APPOINTMENT (OUTPATIENT)
Dept: RHEUMATOLOGY | Facility: CLINIC | Age: 49
End: 2024-12-05

## 2024-12-05 VITALS
HEART RATE: 83 BPM | HEIGHT: 64 IN | DIASTOLIC BLOOD PRESSURE: 93 MMHG | OXYGEN SATURATION: 100 % | WEIGHT: 140 LBS | BODY MASS INDEX: 23.9 KG/M2 | SYSTOLIC BLOOD PRESSURE: 132 MMHG | TEMPERATURE: 98.4 F

## 2024-12-05 DIAGNOSIS — H02.409 UNSPECIFIED PTOSIS OF UNSPECIFIED EYELID: ICD-10-CM

## 2024-12-05 DIAGNOSIS — M54.9 DORSALGIA, UNSPECIFIED: ICD-10-CM

## 2024-12-05 PROCEDURE — 99205 OFFICE O/P NEW HI 60 MIN: CPT

## 2024-12-06 ENCOUNTER — APPOINTMENT (OUTPATIENT)
Dept: OPHTHALMOLOGY | Facility: CLINIC | Age: 49
End: 2024-12-06

## 2024-12-11 ENCOUNTER — NON-APPOINTMENT (OUTPATIENT)
Age: 49
End: 2024-12-11

## 2024-12-19 PROBLEM — F41.9 ANXIETY: Status: RESOLVED | Noted: 2023-03-01 | Resolved: 2024-12-19

## 2024-12-19 PROBLEM — F32.A DEPRESSION: Status: RESOLVED | Noted: 2023-03-01 | Resolved: 2024-12-19

## 2024-12-20 ENCOUNTER — APPOINTMENT (OUTPATIENT)
Dept: NEUROSURGERY | Facility: CLINIC | Age: 49
End: 2024-12-20
Payer: COMMERCIAL

## 2024-12-20 VITALS
TEMPERATURE: 97.3 F | RESPIRATION RATE: 18 BRPM | BODY MASS INDEX: 23.9 KG/M2 | HEART RATE: 91 BPM | HEIGHT: 64 IN | WEIGHT: 140 LBS | OXYGEN SATURATION: 98 % | DIASTOLIC BLOOD PRESSURE: 86 MMHG | SYSTOLIC BLOOD PRESSURE: 137 MMHG

## 2024-12-20 DIAGNOSIS — M51.26 OTHER INTERVERTEBRAL DISC DISPLACEMENT, LUMBAR REGION: ICD-10-CM

## 2024-12-20 DIAGNOSIS — F32.A DEPRESSION, UNSPECIFIED: ICD-10-CM

## 2024-12-20 DIAGNOSIS — Z87.891 PERSONAL HISTORY OF NICOTINE DEPENDENCE: ICD-10-CM

## 2024-12-20 DIAGNOSIS — F41.9 ANXIETY DISORDER, UNSPECIFIED: ICD-10-CM

## 2024-12-20 DIAGNOSIS — G83.4 CAUDA EQUINA SYNDROME: ICD-10-CM

## 2024-12-20 DIAGNOSIS — D33.3 BENIGN NEOPLASM OF CRANIAL NERVES: ICD-10-CM

## 2024-12-20 PROCEDURE — 99215 OFFICE O/P EST HI 40 MIN: CPT

## 2024-12-20 RX ORDER — GABAPENTIN 100 MG/1
100 CAPSULE ORAL 3 TIMES DAILY
Qty: 90 | Refills: 2 | Status: ACTIVE | COMMUNITY
Start: 2024-12-20 | End: 1900-01-01

## 2024-12-23 ENCOUNTER — TRANSCRIPTION ENCOUNTER (OUTPATIENT)
Age: 49
End: 2024-12-23

## 2025-01-27 ENCOUNTER — APPOINTMENT (OUTPATIENT)
Dept: MRI IMAGING | Facility: HOSPITAL | Age: 50
End: 2025-01-27

## 2025-01-27 ENCOUNTER — APPOINTMENT (OUTPATIENT)
Dept: CT IMAGING | Facility: HOSPITAL | Age: 50
End: 2025-01-27

## 2025-01-27 ENCOUNTER — OUTPATIENT (OUTPATIENT)
Dept: OUTPATIENT SERVICES | Facility: HOSPITAL | Age: 50
LOS: 1 days | End: 2025-01-27
Payer: COMMERCIAL

## 2025-01-27 ENCOUNTER — APPOINTMENT (OUTPATIENT)
Dept: NEUROLOGY | Facility: CLINIC | Age: 50
End: 2025-01-27

## 2025-01-27 PROCEDURE — 70486 CT MAXILLOFACIAL W/O DYE: CPT

## 2025-01-27 PROCEDURE — 72148 MRI LUMBAR SPINE W/O DYE: CPT

## 2025-01-27 PROCEDURE — 72148 MRI LUMBAR SPINE W/O DYE: CPT | Mod: 26

## 2025-01-27 PROCEDURE — 70486 CT MAXILLOFACIAL W/O DYE: CPT | Mod: 26

## 2025-02-07 ENCOUNTER — APPOINTMENT (OUTPATIENT)
Dept: OTOLARYNGOLOGY | Facility: CLINIC | Age: 50
End: 2025-02-07
Payer: COMMERCIAL

## 2025-02-07 DIAGNOSIS — R51.9 HEADACHE, UNSPECIFIED: ICD-10-CM

## 2025-02-07 DIAGNOSIS — J32.1 CHRONIC FRONTAL SINUSITIS: ICD-10-CM

## 2025-02-07 PROCEDURE — 99213 OFFICE O/P EST LOW 20 MIN: CPT

## 2025-03-25 ENCOUNTER — EMERGENCY (EMERGENCY)
Facility: HOSPITAL | Age: 50
LOS: 1 days | Discharge: HOME | End: 2025-03-25
Attending: STUDENT IN AN ORGANIZED HEALTH CARE EDUCATION/TRAINING PROGRAM | Admitting: STUDENT IN AN ORGANIZED HEALTH CARE EDUCATION/TRAINING PROGRAM
Payer: COMMERCIAL

## 2025-03-25 VITALS
OXYGEN SATURATION: 97 % | RESPIRATION RATE: 20 BRPM | WEIGHT: 136.03 LBS | HEART RATE: 104 BPM | TEMPERATURE: 98 F | DIASTOLIC BLOOD PRESSURE: 77 MMHG | HEIGHT: 64 IN | SYSTOLIC BLOOD PRESSURE: 144 MMHG

## 2025-03-25 PROCEDURE — 99282 EMERGENCY DEPT VISIT SF MDM: CPT

## 2025-03-25 PROCEDURE — 99283 EMERGENCY DEPT VISIT LOW MDM: CPT

## 2025-03-25 NOTE — ED PROVIDER NOTE - PATIENT PORTAL LINK FT
You can access the FollowMyHealth Patient Portal offered by [facility] by registering at the following website: [email or web address]. By joining [employer]’s FollowMyHealth portal, you will also be able to view your health information using other applications (apps) compatible with our system.

## 2025-03-25 NOTE — ED PROVIDER NOTE - OBJECTIVE STATEMENT
48 yo F presents with ring stuck on left middle finger. She noticed she could not get the ring off yesterday. She has had worsening pain and swelling today.

## 2025-03-25 NOTE — ED ADULT NURSE NOTE - OBJECTIVE STATEMENT
pt is a 48 yo F c/o being unable to get ring off L middle finger. noticed it felt tight yesterday and has been unable to take it off.  pt in nad, a&ox4. ring on base of L third digit with some distal swelling delayed cap refill. denies n/t, loss of sensation.

## 2025-03-25 NOTE — ED PROVIDER NOTE - NSFOLLOWUPINSTRUCTIONS_ED_ALL_ED_FT
Finger Sprain, Adult  A finger sprain is a tear or stretch in a ligament in a finger. Ligaments are tissues that connect bones to each other.    What are the causes?  Finger sprains happen when something makes the bones in the hand move in an abnormal way. They are often caused by a fall or an accident.    What increases the risk?  This condition is more likely to develop in people who:  Participate in sports in which it is easy to fall, such as skiing.  Play sports that involve catching an object, such as basketball.  Have poor strength and flexibility.  What are the signs or symptoms?  Symptoms of this condition include:  Pain or tenderness in the finger.  Swelling in the finger.  A bluish appearance to the finger.  Bruising.  Difficulty bending and flexing the finger.  How is this diagnosed?  This condition is diagnosed with an exam of your finger. Your health care provider may take an X-ray to see if any bones are broken or dislocated.    How is this treated?  Hand showing finger splint on index and middle fingers and wrist.  Treatment for this condition depends on how severe the sprain is. It may involve:  Preventing the finger from moving for a period of time. Your finger may be wrapped in a bandage (dressing) or splint, or your finger may be taped to the fingers beside it (marybel taping).  Medicines for pain.  Exercises to strengthen the finger. These may be recommended when the finger has healed.  Surgery to reconnect the ligament to a bone. This may be done if the ligament was completely torn.  Follow these instructions at home:  If you have a removable splint:    Wear the splint as told by your health care provider. Remove it only as told by your health care provider.  Check the skin around the splint every day. Tell your health care provider about any concerns.  Loosen the splint if your fingers tingle, become numb, or turn cold and blue.  Keep the splint clean.  If the splint is not waterproof:  Do not let it get wet.  Cover it with a watertight covering when you take a bath or shower.  Managing pain, stiffness, and swelling    If directed, put ice on the injured area. To do this:  If you have a removable splint, remove it as told by your health care provider.  Put ice in a plastic bag.  Place a towel between your skin and the bag.  Leave the ice on for 20 minutes, 2–3 times a day.  Remove the ice if your skin turns bright red. This is very important. If you cannot feel pain, heat, or cold, you have a greater risk of damage to the area.  Move your fingers often to reduce stiffness and swelling.  Raise (elevate) the injured area above the level of your heart while you are sitting or lying down.  Medicines    Take over-the-counter and prescription medicines only as told by your health care provider.  Ask your health care provider if the medicine prescribed to you requires you to avoid driving or using machinery.  General instructions    Keep any dressings dry until your health care provider says they can be removed.  If your fingers are marybel taped, replace your marybel taping as told by your health care provider.  Do exercises as told by your health care provider or physical therapist.  Do not wear rings on your injured finger.  Keep all follow-up visits. This is important.  Contact a health care provider if:  Your pain is not controlled with medicine.  Your bruising or swelling gets worse.  Your splint is damaged.  You develop a fever.  Get help right away if:  Your finger is numb or blue.  Your finger feels colder to the touch than normal.  Summary  A finger sprain is a tear or stretch in a ligament in a finger. Ligaments are tissues that connect bones to each other.  Finger sprains happen when something makes the bones in the hand move in an abnormal way. They are often caused by a fall or accident.  This condition is diagnosed with an exam of your finger. Your health care provider may do an X-ray to see if any bones are broken or dislocated.  Treatment for this condition depends on how severe the sprain is. Treatment may involve marybel taping or wearing a splint. Surgery to reconnect the ligament to a bone may be needed if the ligament was torn all the way.  This information is not intended to replace advice given to you by your health care provider. Make sure you discuss any questions you have with your health care provider.

## 2025-03-25 NOTE — ED PROVIDER NOTE - MUSCULOSKELETAL, MLM
Left hand third digit ring stuck at base with swelling and delayed distal cap refill. Full ROM. Normal sensation.

## 2025-03-25 NOTE — ED PROVIDER NOTE - CLINICAL SUMMARY MEDICAL DECISION MAKING FREE TEXT BOX
48 yo F presents with ring stuck on left middle finger. Patient is non-toxic, no acute distress, well-appearing, afebrile with no tachycardia or hypotension.     Wrapped IV tourniquet around finger and applied lubricant. Ring removed without complication.     Upon re-evaluation, after ring removal, distal cap refill normal, strength and sensation normal.     Discussed plan, answered all questions, and addressed all concerns. Reviewed strict return precautions. Patient verbalized understanding and agreement with treatment plan, return precautions, and discharge instructions. Instructed patient to return for any new, worsening, or concerning symptoms.
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
## 2025-03-28 DIAGNOSIS — S60.443A EXTERNAL CONSTRICTION OF LEFT MIDDLE FINGER, INITIAL ENCOUNTER: ICD-10-CM

## 2025-03-28 DIAGNOSIS — Z88.5 ALLERGY STATUS TO NARCOTIC AGENT: ICD-10-CM

## 2025-03-28 NOTE — ASU PATIENT PROFILE, ADULT - NS PREOP UNDERSTANDS INFO
no solids after 10pm Sunday, water allowed up to 430 am Monday, bring ID and insurance card, no valuables or jewelry, dress comfortable, address and call back number given/yes

## 2025-03-28 NOTE — ASU PATIENT PROFILE, ADULT - NSICDXPASTMEDICALHX_GEN_ALL_CORE_FT
PAST MEDICAL HISTORY:  Alopecia     Anxiety and depression     Asthma     PTSD (post-traumatic stress disorder)     Schwannoma cranial nerve

## 2025-03-28 NOTE — ASU PATIENT PROFILE, ADULT - FALL HARM RISK - FALLEN IN PAST
Occupational Therapy  Missed Treatment Note      Patient Name:  Orion Ty   MRN:  [id number]    Patient not seen today secondary to due to overall medical instability. Pt developed ARDS due to pusedomonas pna requiring pt to be sedated and paralyzed.   Will follow up for OT treatment when medically stable.    NING Hassan  2/10/2022   No

## 2025-03-28 NOTE — ASU PATIENT PROFILE, ADULT - NS TRANSFER RESPONSE BELONGING
Pt called to check on surgery date  Discussed surgery on 12/19 for BCS & SNBX, discussed NICK  She will go to ACL for labs tomorrow   yes

## 2025-03-31 ENCOUNTER — TRANSCRIPTION ENCOUNTER (OUTPATIENT)
Age: 50
End: 2025-03-31

## 2025-03-31 ENCOUNTER — RESULT REVIEW (OUTPATIENT)
Age: 50
End: 2025-03-31

## 2025-03-31 ENCOUNTER — APPOINTMENT (OUTPATIENT)
Dept: OTOLARYNGOLOGY | Facility: HOSPITAL | Age: 50
End: 2025-03-31

## 2025-03-31 ENCOUNTER — OUTPATIENT (OUTPATIENT)
Dept: OUTPATIENT SERVICES | Facility: HOSPITAL | Age: 50
LOS: 1 days | Discharge: HOME | End: 2025-03-31
Payer: COMMERCIAL

## 2025-03-31 VITALS
RESPIRATION RATE: 13 BRPM | HEART RATE: 72 BPM | OXYGEN SATURATION: 98 % | DIASTOLIC BLOOD PRESSURE: 88 MMHG | SYSTOLIC BLOOD PRESSURE: 139 MMHG

## 2025-03-31 VITALS
RESPIRATION RATE: 16 BRPM | DIASTOLIC BLOOD PRESSURE: 69 MMHG | OXYGEN SATURATION: 97 % | HEIGHT: 64 IN | WEIGHT: 136.91 LBS | SYSTOLIC BLOOD PRESSURE: 109 MMHG | TEMPERATURE: 100 F | HEART RATE: 76 BPM

## 2025-03-31 DIAGNOSIS — Z90.89 ACQUIRED ABSENCE OF OTHER ORGANS: Chronic | ICD-10-CM

## 2025-03-31 DIAGNOSIS — Z98.890 OTHER SPECIFIED POSTPROCEDURAL STATES: Chronic | ICD-10-CM

## 2025-03-31 PROCEDURE — 31256 EXPLORATION MAXILLARY SINUS: CPT | Mod: LT

## 2025-03-31 PROCEDURE — 30140 RESECT INFERIOR TURBINATE: CPT | Mod: 50

## 2025-03-31 PROCEDURE — 88305 TISSUE EXAM BY PATHOLOGIST: CPT | Mod: 26

## 2025-03-31 PROCEDURE — 31254 NSL/SINS NDSC W/PRTL ETHMDCT: CPT | Mod: RT

## 2025-03-31 PROCEDURE — 61782 SCAN PROC CRANIAL EXTRA: CPT

## 2025-03-31 PROCEDURE — 31253 NSL/SINS NDSC TOTAL: CPT | Mod: LT

## 2025-03-31 PROCEDURE — 88311 DECALCIFY TISSUE: CPT | Mod: 26

## 2025-03-31 DEVICE — STENT DRUG ELUTING SINUS BIO ABSORB INTERSECT ENT PROPEL 23MM
Type: IMPLANTABLE DEVICE | Status: NON-FUNCTIONAL
Removed: 2025-03-31

## 2025-03-31 RX ORDER — LAMOTRIGINE 150 MG/1
1 TABLET ORAL
Refills: 0 | DISCHARGE

## 2025-03-31 RX ORDER — METHYLPREDNISOLONE 4 MG/1
4 TABLET ORAL
Qty: 1 | Refills: 0 | Status: ACTIVE | COMMUNITY
Start: 2025-03-31 | End: 1900-01-01

## 2025-03-31 RX ORDER — FINASTERIDE 1 MG/1
1 TABLET, FILM COATED ORAL
Refills: 0 | DISCHARGE

## 2025-03-31 RX ORDER — EPINEPHRINE HCL 1 MG/ML
1 SOLUTION, NON-ORAL NASAL ONCE
Refills: 0 | Status: DISCONTINUED | OUTPATIENT
Start: 2025-03-31 | End: 2025-03-31

## 2025-03-31 RX ORDER — ACETAMINOPHEN 500 MG/5ML
1000 LIQUID (ML) ORAL ONCE
Refills: 0 | Status: COMPLETED | OUTPATIENT
Start: 2025-03-31 | End: 2025-03-31

## 2025-03-31 RX ORDER — BUPROPION HYDROBROMIDE 522 MG/1
1 TABLET, EXTENDED RELEASE ORAL
Refills: 0 | DISCHARGE

## 2025-03-31 RX ORDER — OXYCODONE AND ACETAMINOPHEN 5; 325 MG/1; MG/1
5-325 TABLET ORAL
Qty: 15 | Refills: 0 | Status: ACTIVE | COMMUNITY
Start: 2025-03-31 | End: 1900-01-01

## 2025-03-31 RX ORDER — SODIUM CHLORIDE 0.65 %
0.65 AEROSOL, SPRAY (ML) NASAL
Qty: 1 | Refills: 5 | Status: ACTIVE | COMMUNITY
Start: 2025-03-31 | End: 1900-01-01

## 2025-03-31 RX ORDER — CYANOCOBALAMIN 1000 UG/ML
1 INJECTION INTRAMUSCULAR; SUBCUTANEOUS
Refills: 0 | DISCHARGE

## 2025-03-31 RX ORDER — APREPITANT 40 MG/1
40 CAPSULE ORAL ONCE
Refills: 0 | Status: COMPLETED | OUTPATIENT
Start: 2025-03-31 | End: 2025-03-31

## 2025-03-31 RX ORDER — VILAZODONE HYDROCHLORIDE 40 MG/1
1 TABLET, FILM COATED ORAL
Refills: 0 | DISCHARGE

## 2025-03-31 RX ADMIN — APREPITANT 40 MILLIGRAM(S): 40 CAPSULE ORAL at 06:56

## 2025-03-31 RX ADMIN — Medication 1000 MILLIGRAM(S): at 06:56

## 2025-03-31 NOTE — ASU DISCHARGE PLAN (ADULT/PEDIATRIC) - FINANCIAL ASSISTANCE
[facility] provides services at a reduced cost to those who are determined to be eligible through [facility]’s financial assistance program. Information regarding [facility]’s financial assistance program can be found by going to [email or web address] or by calling [phone].

## 2025-03-31 NOTE — ASU DISCHARGE PLAN (ADULT/PEDIATRIC) - CARE PROVIDER_API CALL
Edgard Foote  Otolaryngology  [address] [place] [address], [address]  [place] [address]  Phone: [phone]  Fax: [phone]  Follow Up Time:

## 2025-03-31 NOTE — PRE-ANESTHESIA EVALUATION ADULT - NSANTHTOTALSCORECAL_ENT_A_CORE

## 2025-03-31 NOTE — PRE-ANESTHESIA EVALUATION ADULT - NSANTHPMHFT_GEN_ALL_CORE
50 yo F w/ PMH sig for asthma, schwannoma of cranial nerve, alopecia, CORINNE, and PTSD who presents for the above listed procedure.

## 2025-03-31 NOTE — PRE-ANESTHESIA EVALUATION ADULT - NSANTHADDINFOFT_GEN_ALL_CORE
Risks, benefits and alternatives discussed; including but not limited to headache, nausea, bleeding, infection and local trauma/positioning related injury. All questions answered. The patient agrees to proceed.    Plan: GA, BOBO

## 2025-03-31 NOTE — ASU DISCHARGE PLAN (ADULT/PEDIATRIC) - ASU DC SPECIAL INSTRUCTIONSFT
Follow up with Dr. Foote. Call his office to confirm follow up appointment.  Follow all instructions from Dr. Foote's office.  Prescription medications were sent to your home pharmacy. Take all as prescribed.  You may eat a regular diet and resume any home medications.  After sinus surgery, it is important not to blow your nose. If you have to sneeze, sneeze with your mouth open. Avoid lifting heavy objects or doing strenuous activity or exercise. Keep you head above your shoulders (for example, do not bend down to lift objects off the ground). Sleep with your head elevated (with pillows or on a chair). Use stool softeners if you are straining.  Start saline rinses tomorrow. A prescription was sent to [facility] but nasal saline can also be found over the counter at your local pharmacy.

## 2025-03-31 NOTE — BRIEF OPERATIVE NOTE - OPERATION/FINDINGS
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  Mild bilateral mucosal edema and osteomeatal narrowing. 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

## 2025-03-31 NOTE — ASU DISCHARGE PLAN (ADULT/PEDIATRIC) - NS MD DC FALL RISK RISK
For information on Fall & Injury Prevention, visit: [email or web address] OR  [email or web address] OR  https://www.cdc.gov/steadi/patient.html

## 2025-04-07 ENCOUNTER — APPOINTMENT (OUTPATIENT)
Dept: NEUROLOGY | Facility: CLINIC | Age: 50
End: 2025-04-07

## 2025-04-07 ENCOUNTER — APPOINTMENT (OUTPATIENT)
Dept: OTOLARYNGOLOGY | Facility: CLINIC | Age: 50
End: 2025-04-07
Payer: COMMERCIAL

## 2025-04-07 DIAGNOSIS — J32.1 CHRONIC FRONTAL SINUSITIS: ICD-10-CM

## 2025-04-07 PROBLEM — D36.10 BENIGN NEOPLASM OF PERIPHERAL NERVES AND AUTONOMIC NERVOUS SYSTEM, UNSPECIFIED: Chronic | Status: ACTIVE | Noted: 2025-03-28

## 2025-04-07 PROCEDURE — 31237 NSL/SINS NDSC SURG BX POLYPC: CPT | Mod: 58,LT

## 2025-04-07 PROCEDURE — 99024 POSTOP FOLLOW-UP VISIT: CPT

## 2025-04-07 RX ORDER — BUDESONIDE 0.5 MG/2ML
0.5 INHALANT ORAL
Qty: 6 | Refills: 2 | Status: ACTIVE | COMMUNITY
Start: 2025-04-07 | End: 1900-01-01

## 2025-04-09 RX ORDER — MOMETASONE FUROATE 100 %
POWDER (GRAM) MISCELLANEOUS
Qty: 100 | Refills: 3 | Status: ACTIVE | COMMUNITY
Start: 2025-04-09 | End: 1900-01-01

## 2025-04-13 LAB — EAR NOSE AND THROAT CULTURE: ABNORMAL

## 2025-04-14 RX ORDER — LEVOFLOXACIN 500 MG/1
500 TABLET, FILM COATED ORAL DAILY
Qty: 7 | Refills: 0 | Status: ACTIVE | COMMUNITY
Start: 2025-04-11 | End: 1900-01-01

## 2025-04-22 PROBLEM — L65.9 NONSCARRING HAIR LOSS, UNSPECIFIED: Chronic | Status: ACTIVE | Noted: 2025-03-28

## 2025-04-22 PROBLEM — J45.909 UNSPECIFIED ASTHMA, UNCOMPLICATED: Chronic | Status: ACTIVE | Noted: 2025-03-28

## 2025-05-06 ENCOUNTER — APPOINTMENT (OUTPATIENT)
Dept: OTOLARYNGOLOGY | Facility: CLINIC | Age: 50
End: 2025-05-06
Payer: COMMERCIAL

## 2025-05-06 ENCOUNTER — NON-APPOINTMENT (OUTPATIENT)
Age: 50
End: 2025-05-06

## 2025-05-06 VITALS — WEIGHT: 134.8 LBS | BODY MASS INDEX: 22.73 KG/M2 | HEIGHT: 64.5 IN

## 2025-05-06 DIAGNOSIS — J32.1 CHRONIC FRONTAL SINUSITIS: ICD-10-CM

## 2025-05-06 DIAGNOSIS — R51.9 HEADACHE, UNSPECIFIED: ICD-10-CM

## 2025-05-06 PROCEDURE — 99024 POSTOP FOLLOW-UP VISIT: CPT

## 2025-05-06 PROCEDURE — 31231 NASAL ENDOSCOPY DX: CPT | Mod: 58

## 2025-05-28 ENCOUNTER — APPOINTMENT (OUTPATIENT)
Dept: PULMONOLOGY | Facility: CLINIC | Age: 50
End: 2025-05-28
Payer: COMMERCIAL

## 2025-05-28 VITALS
RESPIRATION RATE: 14 BRPM | TEMPERATURE: 97.7 F | DIASTOLIC BLOOD PRESSURE: 88 MMHG | HEIGHT: 64.5 IN | BODY MASS INDEX: 22.3 KG/M2 | WEIGHT: 132.25 LBS | SYSTOLIC BLOOD PRESSURE: 144 MMHG | OXYGEN SATURATION: 99 % | HEART RATE: 87 BPM

## 2025-05-28 DIAGNOSIS — R25.8 OTHER ABNORMAL INVOLUNTARY MOVEMENTS: ICD-10-CM

## 2025-05-28 DIAGNOSIS — R40.0 SOMNOLENCE: ICD-10-CM

## 2025-05-28 PROCEDURE — 99204 OFFICE O/P NEW MOD 45 MIN: CPT

## 2025-05-29 ENCOUNTER — NON-APPOINTMENT (OUTPATIENT)
Age: 50
End: 2025-05-29

## 2025-05-29 ENCOUNTER — TRANSCRIPTION ENCOUNTER (OUTPATIENT)
Age: 50
End: 2025-05-29

## 2025-05-29 LAB
FERRITIN SERPL-MCNC: 58 NG/ML
IRON SATN MFR SERPL: 44 %
IRON SERPL-MCNC: 168 UG/DL
TIBC SERPL-MCNC: 385 UG/DL
TRANSFERRIN SERPL-MCNC: 315 MG/DL
UIBC SERPL-MCNC: 217 UG/DL

## 2025-06-13 ENCOUNTER — APPOINTMENT (OUTPATIENT)
Dept: SLEEP CENTER | Facility: HOSPITAL | Age: 50
End: 2025-06-13

## 2025-06-13 ENCOUNTER — OUTPATIENT (OUTPATIENT)
Dept: OUTPATIENT SERVICES | Facility: HOSPITAL | Age: 50
LOS: 1 days | End: 2025-06-13

## 2025-06-13 DIAGNOSIS — Z90.89 ACQUIRED ABSENCE OF OTHER ORGANS: Chronic | ICD-10-CM

## 2025-06-13 DIAGNOSIS — Z98.890 OTHER SPECIFIED POSTPROCEDURAL STATES: Chronic | ICD-10-CM

## 2025-06-13 DIAGNOSIS — G47.33 OBSTRUCTIVE SLEEP APNEA (ADULT) (PEDIATRIC): ICD-10-CM

## 2025-06-13 PROCEDURE — 95810 POLYSOM 6/> YRS 4/> PARAM: CPT

## 2025-06-13 PROCEDURE — 95810 POLYSOM 6/> YRS 4/> PARAM: CPT | Mod: 26

## 2025-06-24 ENCOUNTER — APPOINTMENT (OUTPATIENT)
Dept: OTOLARYNGOLOGY | Facility: CLINIC | Age: 50
End: 2025-06-24
Payer: COMMERCIAL

## 2025-06-24 VITALS — WEIGHT: 130 LBS | BODY MASS INDEX: 21.93 KG/M2 | HEIGHT: 64.5 IN

## 2025-06-24 PROCEDURE — 99213 OFFICE O/P EST LOW 20 MIN: CPT | Mod: 25

## 2025-06-24 PROCEDURE — 31231 NASAL ENDOSCOPY DX: CPT

## 2025-07-08 ENCOUNTER — APPOINTMENT (OUTPATIENT)
Dept: PULMONOLOGY | Facility: CLINIC | Age: 50
End: 2025-07-08
Payer: COMMERCIAL

## 2025-07-08 VITALS
TEMPERATURE: 208.76 F | HEIGHT: 64.5 IN | HEART RATE: 87 BPM | BODY MASS INDEX: 21.93 KG/M2 | OXYGEN SATURATION: 97 % | SYSTOLIC BLOOD PRESSURE: 125 MMHG | DIASTOLIC BLOOD PRESSURE: 77 MMHG | WEIGHT: 130 LBS

## 2025-07-08 PROCEDURE — G2211 COMPLEX E/M VISIT ADD ON: CPT

## 2025-07-08 PROCEDURE — 99214 OFFICE O/P EST MOD 30 MIN: CPT

## 2025-07-14 NOTE — CONSULT NOTE ADULT - SUBJECTIVE AND OBJECTIVE BOX
**STROKE CODE CONSULT NOTE**    Last known well time/Time of onset of symptoms:    HPI: 47y Female with PMHx of anxiety, depression, complex PTSD, and right vestibular schwannoma presents to the ED with difficulty with speech and cognition and tingling on left arm. 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 She felt self conscious and had anxiety because she was with a patient at the time. She left the appointment and present to the ED because she was worried she was having a stroke. Stroke code called on arrival to ED. On assessment patient emotionally labile and intermittently tearful, able to answer all questions but had slowed speech, intermittent drop out of words, and paraphasic errors. Patient described that she felt as though she had taken acid and would intermittently go in and out of episodes of feeling like she was floating and unable to speak clearly and then it would spontaneously resolve. NIHSS 1. Imaging including CTH, CTP, and CTA all found to be normal. Stroke Fellow Dr. Noel Flores at bedside to discuss risks and benefits of thrombolysis with TNK to the patient as she is a mental health counselor and loss of speech would be disabling. The patient hesitant to proceed with TNK and would like further workup to be completed. MRI short stroke completed in the ED with no changes on DWI and found to be negative for any acute ischemia.   Of note, patient follows with Dr. Ortez from Neurology after she had a similar event 6/2022 where she felt electricity throughout her body, had difficulty with speech, and difficulty walking all of which resolved. She had TIA workup at that time that was     T(C): 36.6 (07-25-23 @ 15:59), Max: 36.9 (07-25-23 @ 11:01)  HR: 78 (07-25-23 @ 15:59) (78 - 102)  BP: 142/81 (07-25-23 @ 15:59) (142/81 - 157/100)  RR: 18 (07-25-23 @ 15:59) (18 - 18)  SpO2: 100% (07-25-23 @ 15:59) (100% - 100%)    PAST MEDICAL & SURGICAL HISTORY:      FAMILY HISTORY:      SOCIAL HISTORY:   Patient lives with *** at ***.   Smoking status:  Drinking:  Drug Use:     ROS: ***  Constitutional: No fever, weight loss or fatigue  Eyes: No eye pain, visual disturbances, or discharge  ENMT:  No difficulty hearing, tinnitus; No sinus or throat pain  Neck: No pain or stiffness  Respiratory: No cough, wheezing, chills or hemoptysis  Cardiovascular: No chest pain, palpitations, shortness of breath, or leg swelling  Gastrointestinal: No abdominal pain. No nausea, vomiting or hematemesis; No diarrhea or constipation. Nohematochezia.  Genitourinary: No dysuria, frequency, hematuria or incontinence  Neurological: As per HPI  Skin: No itching, burning, rashes or lesions   Endocrine: No heat or cold intolerance; No hair loss  Musculoskeletal: No joint pain or swelling; No muscle, back or extremity pain  Heme/Lymph: No easy bruising or bleeding gums    MEDICATIONS  (STANDING):    MEDICATIONS  (PRN):    Allergies    No Known Allergies    Intolerances      Vital Signs Last 24 Hrs  T(C): 36.6 (25 Jul 2023 15:59), Max: 36.9 (25 Jul 2023 11:01)  T(F): 97.8 (25 Jul 2023 15:59), Max: 98.5 (25 Jul 2023 11:01)  HR: 78 (25 Jul 2023 15:59) (78 - 102)  BP: 142/81 (25 Jul 2023 15:59) (142/81 - 157/100)  BP(mean): --  RR: 18 (25 Jul 2023 15:59) (18 - 18)  SpO2: 100% (25 Jul 2023 15:59) (100% - 100%)    Parameters below as of 25 Jul 2023 15:59  Patient On (Oxygen Delivery Method): room air        Physical exam:  Constitutional: No acute distress, conversant  Eyes: Anicteric sclerae, moist conjunctivae, see below for CNs  Neck: trachea midline, FROM, supple, no thyromegaly or lymphadenopathy  Cardiovascular: Regular rate and rhythm, no murmurs, rubs, or gallops. No carotid bruits.   Pulmonary: Anterior breath sounds clear bilaterally, no crackles or wheezing. No use of accessory muscles  GI: Abdomen soft, non-distended, non-tender  Extremities: Radial and DP pulses +2, no edema    Neurologic:  -Mental status: Awake, alert, oriented to person, place, and time. Speech is fluent with intact naming, repetition, and comprehension, no dysarthria. Recent and remote memory intact. Follows commands. Attention/concentration intact. Fund of knowledge appropriate.  -Cranial nerves:   II: Visual fields are full to confrontation.  III, IV, VI: Extraocular movements are intact without nystagmus. Pupils equally round and reactive to light  V:  Facial sensation V1-V3 equal and intact   VII: Face is symmetric with normal eye closure and smile  VIII: Hearing is bilaterally intact to finger rub  IX, X: Uvula is midline and soft palate rises symmetrically  XI: Head turning and shoulder shrug are intact.  XII: Tongue protrudes midline  Motor: Normal bulk and tone. No pronator drift. Strength bilateral upper extremity 5/5, bilateral lower extremities 5/5.  Rapid alternating movements intact and symmetric  Sensation: Intact to light touch bilaterally. No neglect or extinction on double simultaneous testing.  Coordination: No dysmetria on finger-to-nose and heel-to-shin bilaterally  Reflexes: Downgoing toes bilaterally   Gait: Narrow gait and steady    NIHSS: **** ASPECT Score: ***** ICH Score: ****** (GCS)    Fingerstick Blood Glucose: CAPILLARY BLOOD GLUCOSE  97 (25 Jul 2023 16:51)      POCT Blood Glucose.: 97 mg/dL (25 Jul 2023 10:55)    LABS:                        14.3   5.30  )-----------( 298      ( 25 Jul 2023 11:02 )             40.9     07-25    136  |  104  |  14  ----------------------------<  105<H>  4.0   |  19<L>  |  1.03    Ca    9.1      25 Jul 2023 11:02    TPro  7.2  /  Alb  4.5  /  TBili  0.4  /  DBili  x   /  AST  14  /  ALT  11  /  AlkPhos  55  07-25    PT/INR - ( 25 Jul 2023 11:02 )   PT: 11.8 sec;   INR: 1.04          PTT - ( 25 Jul 2023 11:02 )  PTT:27.8 sec      Urinalysis Basic - ( 25 Jul 2023 11:02 )    Color: Yellow / Appearance: Clear / SG: <=1.005 / pH: x  Gluc: 105 mg/dL / Ketone: NEGATIVE  / Bili: Negative / Urobili: 0.2 E.U./dL   Blood: x / Protein: NEGATIVE mg/dL / Nitrite: NEGATIVE   Leuk Esterase: NEGATIVE / RBC: < 5 /HPF / WBC < 5 /HPF   Sq Epi: x / Non Sq Epi: x / Bacteria: Present /HPF        RADIOLOGY & ADDITIONAL STUDIES:      -----------------------------------------------------------------------------------------------------------------  IV-tNK (Y/N):    ***                              Bolus time:    Tenecteplase Dose Verification w/ RN:  Reason IV-tNK not given: ***    **STROKE CODE CONSULT NOTE**    Last known well time/Time of onset of symptoms:    HPI: 47y Female with PMHx of anxiety, depression, complex PTSD, and right vestibular schwannoma presents to the ED with difficulty with speech and cognition and tingling on left arm. 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 She felt self conscious and had anxiety because she was with a patient at the time. She left the appointment and present to the ED because she was worried she was having a stroke. Stroke code called on arrival to ED. On assessment patient emotionally labile and intermittently tearful, able to answer all questions but had slowed speech, intermittent drop out of words, and paraphasic errors. Patient described that she felt as though she had taken acid and would intermittently go in and out of episodes of feeling like she was floating and unable to speak clearly and then it would spontaneously resolve. NIHSS 1. Imaging including CTH, CTP, and CTA all found to be normal. Stroke Fellow Dr. Noel Flores at bedside to discuss risks and benefits of thrombolysis with TNK to the patient as she is a mental health counselor and loss of speech would be disabling. The patient hesitant to proceed with TNK and would like further workup to be completed. MRI short stroke completed in the ED with no changes on DWI and found to be negative for any acute ischemia.   Of note, patient follows with Dr. Ortez from Neurology after she had a similar event 6/2022 where she felt electricity throughout her body, had difficulty with speech, and difficulty walking all of which resolved. She had TIA workup at that time that was negative for any acute ischemia and EEG was unremarkable. On subsequent workup she was found to have right vestibular schwannoma and being followed by ENT and Neurosurgery.     T(C): 36.6 (07-25-23 @ 15:59), Max: 36.9 (07-25-23 @ 11:01)  HR: 78 (07-25-23 @ 15:59) (78 - 102)  BP: 142/81 (07-25-23 @ 15:59) (142/81 - 157/100)  RR: 18 (07-25-23 @ 15:59) (18 - 18)  SpO2: 100% (07-25-23 @ 15:59) (100% - 100%)    PAST MEDICAL & SURGICAL HISTORY:      FAMILY HISTORY:      SOCIAL HISTORY:   Patient lives with  at home.   Smoking status: Denies  Drinking: Denies  Drug Use: Denies    ROS:  Constitutional: No fever, weight loss or fatigue  Eyes: No eye pain, visual disturbances, or discharge  ENMT:   No sinus or throat pain  Neck: No pain or stiffness  Respiratory: No cough, wheezing, chills or hemoptysis  Cardiovascular: No chest pain, palpitations, shortness of breath, or leg swelling  Gastrointestinal: No abdominal pain. No nausea, vomiting or hematemesis; No diarrhea or constipation. Nohematochezia.  Genitourinary: No dysuria, frequency, hematuria or incontinence  Neurological: As per HPI  Skin: No itching, burning, rashes or lesions   Endocrine: No heat or cold intolerance; No hair loss  Musculoskeletal: No joint pain or swelling; No muscle, back or extremity pain  Heme/Lymph: No easy bruising or bleeding gums    MEDICATIONS  (STANDING):    MEDICATIONS  (PRN):    Allergies    No Known Allergies    Intolerances      Vital Signs Last 24 Hrs  T(C): 36.6 (25 Jul 2023 15:59), Max: 36.9 (25 Jul 2023 11:01)  T(F): 97.8 (25 Jul 2023 15:59), Max: 98.5 (25 Jul 2023 11:01)  HR: 78 (25 Jul 2023 15:59) (78 - 102)  BP: 142/81 (25 Jul 2023 15:59) (142/81 - 157/100)  BP(mean): --  RR: 18 (25 Jul 2023 15:59) (18 - 18)  SpO2: 100% (25 Jul 2023 15:59) (100% - 100%)    Parameters below as of 25 Jul 2023 15:59  Patient On (Oxygen Delivery Method): room air        Physical exam:  Constitutional: No acute distress, conversant  Eyes: Anicteric sclerae, moist conjunctivae, see below for CNs  Neck: trachea midline, FROM, supple, no thyromegaly or lymphadenopathy  Cardiovascular: Regular rate and rhythm, no murmurs, rubs, or gallops. No carotid bruits.   Pulmonary: Anterior breath sounds clear bilaterally, no crackles or wheezing. No use of accessory muscles  GI: Abdomen soft, non-distended, non-tender  Extremities: Radial and DP pulses +2, no edema    Neurologic:  -Mental status: Awake, alert, oriented to person, place, and time. Speech is disfluent and hesitant at times but has intact naming, unable to repeat phrases consistently, intact comprehension, no dysarthria. Recent and remote memory intact. Follows commands. Attention/concentration intact. Fund of knowledge appropriate.  -Cranial nerves:   II: Visual fields are full to confrontation.  III, IV, VI: Extraocular movements are intact without nystagmus. Pupils equally round and reactive to light  V:  Facial sensation V1-V3 equal and intact   VII: Face is symmetric with normal smile  Motor: Normal bulk and tone. No pronator drift. Strength bilateral upper extremity 5/5, bilateral lower extremities 5/5.  Rapid alternating movements intact and symmetric  Sensation: Intact to light touch bilaterally. Has tingling in bilateral hands, subjectively feet feel intermittently heavy.  No neglect or extinction on double simultaneous testing.  Coordination: No dysmetria on finger-to-nose bilaterally      NIHSS: 1    Fingerstick Blood Glucose: CAPILLARY BLOOD GLUCOSE  97 (25 Jul 2023 16:51)      POCT Blood Glucose.: 97 mg/dL (25 Jul 2023 10:55)    LABS:                        14.3   5.30  )-----------( 298      ( 25 Jul 2023 11:02 )             40.9     07-25    136  |  104  |  14  ----------------------------<  105<H>  4.0   |  19<L>  |  1.03    Ca    9.1      25 Jul 2023 11:02    TPro  7.2  /  Alb  4.5  /  TBili  0.4  /  DBili  x   /  AST  14  /  ALT  11  /  AlkPhos  55  07-25    PT/INR - ( 25 Jul 2023 11:02 )   PT: 11.8 sec;   INR: 1.04          PTT - ( 25 Jul 2023 11:02 )  PTT:27.8 sec      Urinalysis Basic - ( 25 Jul 2023 11:02 )    Color: Yellow / Appearance: Clear / SG: <=1.005 / pH: x  Gluc: 105 mg/dL / Ketone: NEGATIVE  / Bili: Negative / Urobili: 0.2 E.U./dL   Blood: x / Protein: NEGATIVE mg/dL / Nitrite: NEGATIVE   Leuk Esterase: NEGATIVE / RBC: < 5 /HPF / WBC < 5 /HPF   Sq Epi: x / Non Sq Epi: x / Bacteria: Present /HPF        RADIOLOGY & ADDITIONAL STUDIES:    CT Brain Stroke Protocol (07.25.23 @ 11:07) >  IMPRESSION: No intracranial hemorrhage or acute transcortical infarct.    CT Angio Brain Stroke Protocol  w/ IV Cont (07.25.23 @ 11:19) >  IMPRESSION: No large vessel occlusion.    CT Angio Neck Stroke Protocol w/ IV Cont (07.25.23 @ 11:18) >  IMPRESSION: Normal CTA examination of the neck.    CT Brain Perfusion Maps Stroke (07.25.23 @ 11:19) >  IMPRESSION: Normal CT perfusion study.    < from: MR Head No Cont (07.25.23 @ 13:46) >  IMPRESSION: No acute ischemia.                            -----------------------------------------------------------------------------------------------------------------  IV-tNK (Y/N):    N                            Bolus time:    Tenecteplase Dose Verification w/ RN:  Reason IV-tNK not given: Isolated improving deficits with no DWI changes on MRI   Patent

## 2025-09-15 ENCOUNTER — APPOINTMENT (OUTPATIENT)
Dept: OTOLARYNGOLOGY | Facility: CLINIC | Age: 50
End: 2025-09-15

## 2025-09-16 ENCOUNTER — APPOINTMENT (OUTPATIENT)
Dept: OTOLARYNGOLOGY | Facility: CLINIC | Age: 50
End: 2025-09-16

## (undated) DEVICE — MEDTRONIC INSTRUMENT TRACKER ENT

## (undated) DEVICE — PETRI DISH MED 3.5"

## (undated) DEVICE — BLADE MEDTRONIC ENT FUSION QUADCUT ROTATABLE STRAIGHT 4.3MM X 13CM

## (undated) DEVICE — SUCTION CATH ARGYLE WHISTLE TIP 14FR STRAIGHT PACKED

## (undated) DEVICE — BLADE MEDTRONIC ENT INFERIOR TURBINATE ROTATABLE STRAIGHT 2.9MM X 11CM

## (undated) DEVICE — MEDTRONIC AXIEM PATIENT TRACKER NON-INVASIVE

## (undated) DEVICE — STAPLER SKIN VISI-STAT 35 WIDE

## (undated) DEVICE — Device

## (undated) DEVICE — GLV 7 PROTEXIS (WHITE)

## (undated) DEVICE — TUBING SUCTION NONCONDUCTIVE 6MM X 12FT